# Patient Record
Sex: MALE | Race: BLACK OR AFRICAN AMERICAN
[De-identification: names, ages, dates, MRNs, and addresses within clinical notes are randomized per-mention and may not be internally consistent; named-entity substitution may affect disease eponyms.]

---

## 2017-01-30 ENCOUNTER — APPOINTMENT (OUTPATIENT)
Dept: SURGERY | Facility: CLINIC | Age: 57
End: 2017-01-30

## 2017-01-30 VITALS
BODY MASS INDEX: 27.11 KG/M2 | SYSTOLIC BLOOD PRESSURE: 121 MMHG | DIASTOLIC BLOOD PRESSURE: 78 MMHG | HEIGHT: 69 IN | HEART RATE: 60 BPM | TEMPERATURE: 98.3 F | WEIGHT: 183 LBS

## 2017-01-30 DIAGNOSIS — Z71.9 COUNSELING, UNSPECIFIED: ICD-10-CM

## 2017-02-13 ENCOUNTER — LABORATORY RESULT (OUTPATIENT)
Age: 57
End: 2017-02-13

## 2017-02-13 ENCOUNTER — APPOINTMENT (OUTPATIENT)
Dept: SURGERY | Facility: CLINIC | Age: 57
End: 2017-02-13

## 2018-06-19 ENCOUNTER — RECORD ABSTRACTING (OUTPATIENT)
Age: 58
End: 2018-06-19

## 2018-06-19 DIAGNOSIS — I05.9 RHEUMATIC MITRAL VALVE DISEASE, UNSPECIFIED: ICD-10-CM

## 2018-06-29 ENCOUNTER — APPOINTMENT (OUTPATIENT)
Dept: PULMONOLOGY | Facility: CLINIC | Age: 58
End: 2018-06-29

## 2018-08-10 ENCOUNTER — APPOINTMENT (OUTPATIENT)
Dept: PULMONOLOGY | Facility: CLINIC | Age: 58
End: 2018-08-10
Payer: COMMERCIAL

## 2018-08-10 VITALS
HEIGHT: 69 IN | SYSTOLIC BLOOD PRESSURE: 108 MMHG | OXYGEN SATURATION: 98 % | TEMPERATURE: 97.8 F | RESPIRATION RATE: 16 BRPM | HEART RATE: 67 BPM | WEIGHT: 170 LBS | DIASTOLIC BLOOD PRESSURE: 73 MMHG | BODY MASS INDEX: 25.18 KG/M2

## 2018-08-10 PROCEDURE — 94726 PLETHYSMOGRAPHY LUNG VOLUMES: CPT

## 2018-08-10 PROCEDURE — 99213 OFFICE O/P EST LOW 20 MIN: CPT | Mod: 25

## 2018-08-10 PROCEDURE — 94010 BREATHING CAPACITY TEST: CPT

## 2018-08-10 PROCEDURE — 94729 DIFFUSING CAPACITY: CPT

## 2018-11-28 ENCOUNTER — APPOINTMENT (OUTPATIENT)
Dept: PULMONOLOGY | Facility: CLINIC | Age: 58
End: 2018-11-28
Payer: COMMERCIAL

## 2018-11-28 VITALS
SYSTOLIC BLOOD PRESSURE: 127 MMHG | TEMPERATURE: 97.6 F | HEART RATE: 67 BPM | OXYGEN SATURATION: 97 % | DIASTOLIC BLOOD PRESSURE: 71 MMHG | RESPIRATION RATE: 18 BRPM

## 2018-11-28 PROCEDURE — 36415 COLL VENOUS BLD VENIPUNCTURE: CPT

## 2018-11-28 PROCEDURE — 94060 EVALUATION OF WHEEZING: CPT

## 2018-11-28 PROCEDURE — 99213 OFFICE O/P EST LOW 20 MIN: CPT | Mod: 25

## 2018-11-29 LAB
ACE BLD-CCNC: 171 U/L
ALBUMIN SERPL ELPH-MCNC: 4.4 G/DL
ALP BLD-CCNC: 81 U/L
ALT SERPL-CCNC: 26 U/L
ANION GAP SERPL CALC-SCNC: 12 MMOL/L
AST SERPL-CCNC: 29 U/L
BASOPHILS # BLD AUTO: 0.02 K/UL
BASOPHILS NFR BLD AUTO: 0.5 %
BILIRUB SERPL-MCNC: 0.3 MG/DL
BUN SERPL-MCNC: 8 MG/DL
CALCIUM SERPL-MCNC: 8.9 MG/DL
CHLORIDE SERPL-SCNC: 104 MMOL/L
CO2 SERPL-SCNC: 24 MMOL/L
CREAT SERPL-MCNC: 1 MG/DL
EOSINOPHIL # BLD AUTO: 0.21 K/UL
EOSINOPHIL NFR BLD AUTO: 5.7 %
GLUCOSE SERPL-MCNC: 105 MG/DL
HCT VFR BLD CALC: 41 %
HGB BLD-MCNC: 13.3 G/DL
IMM GRANULOCYTES NFR BLD AUTO: 0 %
LYMPHOCYTES # BLD AUTO: 1.01 K/UL
LYMPHOCYTES NFR BLD AUTO: 27.3 %
MAN DIFF?: NORMAL
MCHC RBC-ENTMCNC: 24.9 PG
MCHC RBC-ENTMCNC: 32.4 GM/DL
MCV RBC AUTO: 76.8 FL
MONOCYTES # BLD AUTO: 0.53 K/UL
MONOCYTES NFR BLD AUTO: 14.3 %
NEUTROPHILS # BLD AUTO: 1.93 K/UL
NEUTROPHILS NFR BLD AUTO: 52.2 %
PLATELET # BLD AUTO: 168 K/UL
POTASSIUM SERPL-SCNC: 4.6 MMOL/L
PROT SERPL-MCNC: 8.1 G/DL
RBC # BLD: 5.34 M/UL
RBC # FLD: 14.6 %
SODIUM SERPL-SCNC: 140 MMOL/L
WBC # FLD AUTO: 3.7 K/UL

## 2018-12-05 LAB — TOTAL IGE SMQN RAST: 21 KU/L

## 2018-12-12 ENCOUNTER — TRANSCRIPTION ENCOUNTER (OUTPATIENT)
Age: 58
End: 2018-12-12

## 2019-02-27 ENCOUNTER — APPOINTMENT (OUTPATIENT)
Dept: PULMONOLOGY | Facility: CLINIC | Age: 59
End: 2019-02-27
Payer: COMMERCIAL

## 2019-02-27 VITALS
HEIGHT: 69 IN | OXYGEN SATURATION: 97 % | DIASTOLIC BLOOD PRESSURE: 64 MMHG | SYSTOLIC BLOOD PRESSURE: 119 MMHG | HEART RATE: 70 BPM | WEIGHT: 170 LBS | BODY MASS INDEX: 25.18 KG/M2

## 2019-02-27 PROCEDURE — 94729 DIFFUSING CAPACITY: CPT

## 2019-02-27 PROCEDURE — 94727 GAS DIL/WSHOT DETER LNG VOL: CPT

## 2019-02-27 PROCEDURE — 94010 BREATHING CAPACITY TEST: CPT

## 2019-02-27 PROCEDURE — 99213 OFFICE O/P EST LOW 20 MIN: CPT | Mod: 25

## 2019-03-14 NOTE — ADDENDUM
[FreeTextEntry1] : Lab review\par CBC mild reduction in white blood count 3.7\par Hemoglobin 13.3 hematocrit 41.0\par Platelet count 168,000\par Monocytes 14.3%\par Glucose 105 electrolytes normal\par Serum calcium 8.9\par Liver function tests normal\par \par Serum IgE 21

## 2019-03-14 NOTE — REVIEW OF SYSTEMS
[As Noted in HPI] : as noted in HPI [Negative] : Pulmonary Hypertension [de-identified] : left upper vshoulder sarcoid skin lesion bx proven

## 2019-03-14 NOTE — PROCEDURE
[FreeTextEntry1] : PFT Aug 10 2018\par  no bronchodilator\par Very mild restrictive impairment with a total capacity 78% of predicted\par \par Flow rates are normal\par Moderate diffusion abnormality with a loss of functioning alveolar capillary units at 58% of predicted\par There was some noted to be some decline in the total capacity and diffusion but he does have a waxing and waning picture.\par HGB12.1\par \par PFT without bronchodilator February 27, 2019\par Flow rates normal range\par Minimal reduction in total lung capacity 77% predicted with a restrictive ventilatory impairment\par Moderate reduction diffusion 54% of predicted with loss of functioning alveolar capillary units\par Hemoglobin 13.3

## 2019-03-14 NOTE — HISTORY OF PRESENT ILLNESS
[Stable] : are stable [Difficulty Breathing During Exertion] : denies dyspnea on exertion [Feelings Of Weakness On Exertion] : denies exercise intolerance [Cough] : denies coughing [Wheezing] : denies wheezing [Regional Soft Tissue Swelling Both Lower Extremities] : denies lower extremity edema [Chest Pain Or Discomfort] : denies chest pain [Fever] : denies fever [Date: ___] : was performed [unfilled] [Wt Gain ___ Lbs] : no recent weight gain [Wt Loss ___ Lbs] : no recent weight loss [Oxygen] : the patient uses no supplemental oxygen [de-identified] : extensive finding consistent with sarcoidosis [FreeTextEntry1] : Patient with long-standing significant biopsy-proven sarcoidosis pulmonary and skin.\par No active symptoms of chest pain pleuritic chest pain fevers chills or night sweats purulent sputum. Denies hemoptysis. No increased shortness of breath. No cough or wheeze noted. No recent U. upper respiratory infections.\par \par Status post CAT scan the chest May 12, 2017 with no interval change compared to prior scan of April 6, 2016 able to demonstrate right upper lobe consolidation with no significant change left apical consolidation with no significant change. Innumerable pleural and parenchymal nodularity and nodules but again no change in size or number reported.\par Mediastinum demonstrates prominent mediastinal hilar axillary lymphadenopathy as well as subcarinal adenopathy or reported unchanged. There was some component of partial calcification of the mediastinal and hilar lymph node\par \par ADDENDUM\par 3/14/19\par Ophthalmology March 4, 2019\par No reported uveitis due to sarcoidosis\par

## 2019-03-14 NOTE — PHYSICAL EXAM
[General Appearance - Well Developed] : well developed [Normal Appearance] : normal appearance [Well Groomed] : well groomed [General Appearance - Well Nourished] : well nourished [No Deformities] : no deformities [General Appearance - In No Acute Distress] : no acute distress [Normal Conjunctiva] : the conjunctiva exhibited no abnormalities [Eyelids - No Xanthelasma] : the eyelids demonstrated no xanthelasmas [Normal Oropharynx] : normal oropharynx [Neck Appearance] : the appearance of the neck was normal [Neck Cervical Mass (___cm)] : no neck mass was observed [Jugular Venous Distention Increased] : there was no jugular-venous distention [Thyroid Diffuse Enlargement] : the thyroid was not enlarged [Thyroid Nodule] : there were no palpable thyroid nodules [Heart Rate And Rhythm] : heart rate and rhythm were normal [Heart Sounds] : normal S1 and S2 [Murmurs] : no murmurs present [Respiration, Rhythm And Depth] : normal respiratory rhythm and effort [Exaggerated Use Of Accessory Muscles For Inspiration] : no accessory muscle use [Auscultation Breath Sounds / Voice Sounds] : lungs were clear to auscultation bilaterally [Lungs Percussion] : the lungs were normal to percussion [Bowel Sounds] : normal bowel sounds [Abdomen Soft] : soft [Abdomen Tenderness] : non-tender [Abdomen Mass (___ Cm)] : no abdominal mass palpated [Nail Clubbing] : no clubbing of the fingernails [Cyanosis, Localized] : no localized cyanosis [Petechial Hemorrhages (___cm)] : no petechial hemorrhages [] : no ischemic changes [Deep Tendon Reflexes (DTR)] : deep tendon reflexes were 2+ and symmetric [Sensation] : the sensory exam was normal to light touch and pinprick [No Focal Deficits] : no focal deficits [Oriented To Time, Place, And Person] : oriented to person, place, and time [Impaired Insight] : insight and judgment were intact [Affect] : the affect was normal [FreeTextEntry1] : chronic sarcoid rash Left posterior  thorax

## 2019-03-14 NOTE — DISCUSSION/SUMMARY
[FreeTextEntry1] : Sarcoidosis\par \par Declined fu shot  \par f/u PFT with Box summer\par Noted last CXR April 2018-f/u\par Pending OPTH\par f/u ECHO

## 2019-03-18 ENCOUNTER — APPOINTMENT (OUTPATIENT)
Dept: CV DIAGNOSITCS | Facility: HOSPITAL | Age: 59
End: 2019-03-18

## 2019-03-18 ENCOUNTER — OUTPATIENT (OUTPATIENT)
Dept: OUTPATIENT SERVICES | Facility: HOSPITAL | Age: 59
LOS: 1 days | End: 2019-03-18
Payer: COMMERCIAL

## 2019-03-18 DIAGNOSIS — D86.0 SARCOIDOSIS OF LUNG: ICD-10-CM

## 2019-03-18 PROCEDURE — 93306 TTE W/DOPPLER COMPLETE: CPT

## 2019-03-18 PROCEDURE — 93306 TTE W/DOPPLER COMPLETE: CPT | Mod: 26

## 2019-05-29 ENCOUNTER — APPOINTMENT (OUTPATIENT)
Dept: PULMONOLOGY | Facility: CLINIC | Age: 59
End: 2019-05-29
Payer: COMMERCIAL

## 2019-05-29 VITALS
HEART RATE: 68 BPM | SYSTOLIC BLOOD PRESSURE: 114 MMHG | TEMPERATURE: 98.2 F | DIASTOLIC BLOOD PRESSURE: 71 MMHG | OXYGEN SATURATION: 96 %

## 2019-05-29 PROCEDURE — 71046 X-RAY EXAM CHEST 2 VIEWS: CPT

## 2019-05-29 PROCEDURE — 94060 EVALUATION OF WHEEZING: CPT

## 2019-05-29 PROCEDURE — 99213 OFFICE O/P EST LOW 20 MIN: CPT | Mod: 25

## 2019-05-29 NOTE — REVIEW OF SYSTEMS
[As Noted in HPI] : as noted in HPI [Negative] : Pulmonary Hypertension [de-identified] : left upper vshoulder sarcoid skin lesion bx proven

## 2019-05-29 NOTE — PHYSICAL EXAM
[General Appearance - Well Developed] : well developed [Normal Appearance] : normal appearance [Well Groomed] : well groomed [General Appearance - Well Nourished] : well nourished [No Deformities] : no deformities [General Appearance - In No Acute Distress] : no acute distress [Eyelids - No Xanthelasma] : the eyelids demonstrated no xanthelasmas [Normal Conjunctiva] : the conjunctiva exhibited no abnormalities [Normal Oropharynx] : normal oropharynx [Neck Appearance] : the appearance of the neck was normal [Neck Cervical Mass (___cm)] : no neck mass was observed [Jugular Venous Distention Increased] : there was no jugular-venous distention [Thyroid Diffuse Enlargement] : the thyroid was not enlarged [Thyroid Nodule] : there were no palpable thyroid nodules [Heart Rate And Rhythm] : heart rate and rhythm were normal [Heart Sounds] : normal S1 and S2 [Murmurs] : no murmurs present [Exaggerated Use Of Accessory Muscles For Inspiration] : no accessory muscle use [Respiration, Rhythm And Depth] : normal respiratory rhythm and effort [Auscultation Breath Sounds / Voice Sounds] : lungs were clear to auscultation bilaterally [Lungs Percussion] : the lungs were normal to percussion [Bowel Sounds] : normal bowel sounds [Abdomen Soft] : soft [Abdomen Tenderness] : non-tender [Abdomen Mass (___ Cm)] : no abdominal mass palpated [Nail Clubbing] : no clubbing of the fingernails [Cyanosis, Localized] : no localized cyanosis [Petechial Hemorrhages (___cm)] : no petechial hemorrhages [] : no ischemic changes [Deep Tendon Reflexes (DTR)] : deep tendon reflexes were 2+ and symmetric [Sensation] : the sensory exam was normal to light touch and pinprick [No Focal Deficits] : no focal deficits [Oriented To Time, Place, And Person] : oriented to person, place, and time [Affect] : the affect was normal [Impaired Insight] : insight and judgment were intact [FreeTextEntry1] : chronic sarcoid rash Left posterior  thorax

## 2019-05-29 NOTE — HISTORY OF PRESENT ILLNESS
[FreeTextEntry1] : Patient with long-standing significant biopsy-proven sarcoidosis pulmonary and skin.\par No active symptoms of chest pain pleuritic chest pain fevers chills or night sweats purulent sputum. Denies hemoptysis. No increased shortness of breath. No cough or wheeze noted. No recent U. upper respiratory infections.\par \par Status post CAT scan the chest May 12, 2017 with no interval change compared to prior scan of April 6, 2016 able to demonstrate right upper lobe consolidation with no significant change left apical consolidation with no significant change. Innumerable pleural and parenchymal nodularity and nodules but again no change in size or number reported.\par Mediastinum demonstrates prominent mediastinal hilar axillary lymphadenopathy as well as subcarinal adenopathy or reported unchanged. There was some component of partial calcification of the mediastinal and hilar lymph node\par \par ADDENDUM\par 3/14/19\par Ophthalmology March 4, 2019\par No reported uveitis due to sarcoidosis\par

## 2019-05-29 NOTE — DISCUSSION/SUMMARY
[FreeTextEntry1] : Echocardiogram March 18, 2019\par Minimal mitral regurgitation\par Normal left ventricular systolic function\par Normal right ventricular systolic function\par No reported pulmonary hypertension\par \par Pulmonary Sarcoidosis\par PFT with Box at f/u\par  Noted up  to date OPTH and ECHO

## 2019-05-29 NOTE — PROCEDURE
[FreeTextEntry1] : \par PFT without bronchodilator February 27, 2019\par Flow rates normal range\par Minimal reduction in total lung capacity 77% predicted with a restrictive ventilatory impairment\par Moderate reduction diffusion 54% of predicted with loss of functioning alveolar capillary units\par Hemoglobin 13.3\par \par Spirometry May 29, 2019\par Minimal reduction FVC 77% predicted otherwise normal flow rates with no decline\par No bronchodilator response at FEV1\par \par Chest x-ray PA lateral projection May 29, 2019\par Cardiac size normal\par Findings consistent with multiple bilateral nodularity interstitial lung disease consistent with known diagnosis of sarcoidosis\par Impression stage III–4 radiographic features of sarcoidosis

## 2019-08-19 ENCOUNTER — APPOINTMENT (OUTPATIENT)
Dept: SURGERY | Facility: CLINIC | Age: 59
End: 2019-08-19
Payer: COMMERCIAL

## 2019-08-19 PROCEDURE — 45378 DIAGNOSTIC COLONOSCOPY: CPT

## 2019-08-27 ENCOUNTER — APPOINTMENT (OUTPATIENT)
Dept: PULMONOLOGY | Facility: CLINIC | Age: 59
End: 2019-08-27
Payer: COMMERCIAL

## 2019-08-27 ENCOUNTER — TRANSCRIPTION ENCOUNTER (OUTPATIENT)
Age: 59
End: 2019-08-27

## 2019-08-27 VITALS
RESPIRATION RATE: 14 BRPM | DIASTOLIC BLOOD PRESSURE: 73 MMHG | BODY MASS INDEX: 25.18 KG/M2 | OXYGEN SATURATION: 99 % | HEART RATE: 84 BPM | WEIGHT: 170 LBS | HEIGHT: 69 IN | SYSTOLIC BLOOD PRESSURE: 107 MMHG

## 2019-08-27 LAB — POCT - HEMOGLOBIN (HGB), QUANTITATIVE, TRANSCUTANEOUS: 15.6

## 2019-08-27 PROCEDURE — 88738 HGB QUANT TRANSCUTANEOUS: CPT

## 2019-08-27 PROCEDURE — 94726 PLETHYSMOGRAPHY LUNG VOLUMES: CPT

## 2019-08-27 PROCEDURE — 94750: CPT

## 2019-08-27 PROCEDURE — 94060 EVALUATION OF WHEEZING: CPT

## 2019-08-27 PROCEDURE — 99213 OFFICE O/P EST LOW 20 MIN: CPT | Mod: 25

## 2019-08-27 PROCEDURE — 94664 DEMO&/EVAL PT USE INHALER: CPT | Mod: 59

## 2019-08-27 PROCEDURE — 36415 COLL VENOUS BLD VENIPUNCTURE: CPT

## 2019-08-27 PROCEDURE — 94729 DIFFUSING CAPACITY: CPT

## 2019-08-28 LAB
ALBUMIN SERPL ELPH-MCNC: 4.6 G/DL
ALP BLD-CCNC: 96 U/L
ALT SERPL-CCNC: 23 U/L
ANION GAP SERPL CALC-SCNC: 12 MMOL/L
AST SERPL-CCNC: 25 U/L
BASOPHILS # BLD AUTO: 0.03 K/UL
BASOPHILS NFR BLD AUTO: 0.6 %
BILIRUB SERPL-MCNC: 0.4 MG/DL
BUN SERPL-MCNC: 9 MG/DL
CALCIUM SERPL-MCNC: 9.3 MG/DL
CHLORIDE SERPL-SCNC: 104 MMOL/L
CO2 SERPL-SCNC: 25 MMOL/L
CREAT SERPL-MCNC: 0.92 MG/DL
EOSINOPHIL # BLD AUTO: 0.16 K/UL
EOSINOPHIL NFR BLD AUTO: 3.3 %
GLUCOSE SERPL-MCNC: 105 MG/DL
HCT VFR BLD CALC: 47.1 %
HGB BLD-MCNC: 14.7 G/DL
IMM GRANULOCYTES NFR BLD AUTO: 0.2 %
LYMPHOCYTES # BLD AUTO: 1.26 K/UL
LYMPHOCYTES NFR BLD AUTO: 25.9 %
MAN DIFF?: NORMAL
MCHC RBC-ENTMCNC: 24.7 PG
MCHC RBC-ENTMCNC: 31.2 GM/DL
MCV RBC AUTO: 79 FL
MONOCYTES # BLD AUTO: 0.6 K/UL
MONOCYTES NFR BLD AUTO: 12.3 %
NEUTROPHILS # BLD AUTO: 2.8 K/UL
NEUTROPHILS NFR BLD AUTO: 57.7 %
PLATELET # BLD AUTO: 183 K/UL
POTASSIUM SERPL-SCNC: 5.4 MMOL/L
PROT SERPL-MCNC: 8.2 G/DL
RBC # BLD: 5.96 M/UL
RBC # FLD: 14.3 %
SODIUM SERPL-SCNC: 141 MMOL/L
WBC # FLD AUTO: 4.86 K/UL

## 2019-08-30 LAB — ACE BLD-CCNC: 156 U/L

## 2019-08-30 NOTE — REVIEW OF SYSTEMS
[As Noted in HPI] : as noted in HPI [Negative] : Pulmonary Hypertension [de-identified] : left upper vshoulder sarcoid skin lesion bx proven

## 2019-08-30 NOTE — PROCEDURE
[FreeTextEntry1] : PFT  body box August 27, 2019\par Spirometry normal\par No response to bronchodilator at FEV1\par Lung volumes normal with total lung capacity 81% predicted.\par Resistance and specific conductance normal.\par Moderate reduction diffusion 54% predicted with a loss of functioning alveolocapillary units\par No decline in pulmonary physiology\par \par Chest x-ray PA lateral projection May 29, 2019\par Cardiac size normal\par Findings consistent with multiple bilateral nodularity interstitial lung disease consistent with known diagnosis of sarcoidosis\par Impression stage III–4 radiographic features of sarcoidosis\par \par Blood Draw\par CBC comprehensive metabolic profile ACE level\par Data review August 28, 2019\par CBC White blood count 4.86 hemoglobin 14.7 hematocrit 47.1\par Platelet count 183,000\par Serum potassium 5.4\par BUN 9 creatinine 0.92\par Liver function testing normal\par Total protein normal 8.2 ACE level pending\par  ACE elevated 156 but decline from prior 171

## 2019-08-30 NOTE — HISTORY OF PRESENT ILLNESS
[Stable] : are stable [Difficulty Breathing During Exertion] : denies dyspnea on exertion [Feelings Of Weakness On Exertion] : denies exercise intolerance [Cough] : denies coughing [Wheezing] : denies wheezing [Regional Soft Tissue Swelling Both Lower Extremities] : denies lower extremity edema [Chest Pain Or Discomfort] : denies chest pain [Fever] : denies fever [Date: ___] : was performed [unfilled] [Wt Gain ___ Lbs] : no recent weight gain [Wt Loss ___ Lbs] : no recent weight loss [Oxygen] : the patient uses no supplemental oxygen [de-identified] : extensive finding consistent with sarcoidosis [FreeTextEntry1] : Patient with long-standing significant biopsy-proven sarcoidosis pulmonary and skin.\par No active symptoms of chest pain pleuritic chest pain fevers chills or night sweats purulent sputum. Denies hemoptysis. No increased shortness of breath. No cough or wheeze noted. No recent U. upper respiratory infections.\par \par Status post CAT scan the chest May 12, 2017 with no interval change compared to prior scan of April 6, 2016 able to demonstrate right upper lobe consolidation with no significant change left apical consolidation with no significant change. Innumerable pleural and parenchymal nodularity and nodules but again no change in size or number reported.\par Mediastinum demonstrates prominent mediastinal hilar axillary lymphadenopathy as well as subcarinal adenopathy or reported unchanged. There was some component of partial calcification of the mediastinal and hilar lymph node\par \par ADDENDUM\par 3/14/19\par Ophthalmology March 4, 2019\par No reported uveitis due to sarcoidosis\par

## 2019-08-30 NOTE — DISCUSSION/SUMMARY
[FreeTextEntry1] : Echocardiogram March 18, 2019\par Minimal mitral regurgitation\par Normal left ventricular systolic function\par Normal right ventricular systolic function\par No reported pulmonary hypertension\par Pulmonary sarcoidosis with extensive radiographic changes and abnormal pulmonary physiology with diffusion 54% predicted but stable without decline\par Ophthalmology appointment March 2020\par Echocardiogram follow-up March 2020\par Laboratory data pending

## 2019-08-30 NOTE — PHYSICAL EXAM
[Normal Appearance] : normal appearance [General Appearance - Well Developed] : well developed [Well Groomed] : well groomed [General Appearance - Well Nourished] : well nourished [No Deformities] : no deformities [General Appearance - In No Acute Distress] : no acute distress [Eyelids - No Xanthelasma] : the eyelids demonstrated no xanthelasmas [Normal Conjunctiva] : the conjunctiva exhibited no abnormalities [Normal Oropharynx] : normal oropharynx [Neck Appearance] : the appearance of the neck was normal [Jugular Venous Distention Increased] : there was no jugular-venous distention [Neck Cervical Mass (___cm)] : no neck mass was observed [Thyroid Nodule] : there were no palpable thyroid nodules [Thyroid Diffuse Enlargement] : the thyroid was not enlarged [Heart Sounds] : normal S1 and S2 [Heart Rate And Rhythm] : heart rate and rhythm were normal [Murmurs] : no murmurs present [Exaggerated Use Of Accessory Muscles For Inspiration] : no accessory muscle use [Respiration, Rhythm And Depth] : normal respiratory rhythm and effort [Lungs Percussion] : the lungs were normal to percussion [Auscultation Breath Sounds / Voice Sounds] : lungs were clear to auscultation bilaterally [Bowel Sounds] : normal bowel sounds [Abdomen Soft] : soft [Abdomen Tenderness] : non-tender [Abdomen Mass (___ Cm)] : no abdominal mass palpated [Nail Clubbing] : no clubbing of the fingernails [Cyanosis, Localized] : no localized cyanosis [Petechial Hemorrhages (___cm)] : no petechial hemorrhages [] : no ischemic changes [Deep Tendon Reflexes (DTR)] : deep tendon reflexes were 2+ and symmetric [No Focal Deficits] : no focal deficits [Sensation] : the sensory exam was normal to light touch and pinprick [Oriented To Time, Place, And Person] : oriented to person, place, and time [Affect] : the affect was normal [Impaired Insight] : insight and judgment were intact [FreeTextEntry1] : chronic sarcoid rash Left posterior  thorax

## 2019-11-19 ENCOUNTER — APPOINTMENT (OUTPATIENT)
Dept: PULMONOLOGY | Facility: CLINIC | Age: 59
End: 2019-11-19
Payer: COMMERCIAL

## 2019-11-19 VITALS
TEMPERATURE: 98.1 F | HEIGHT: 69 IN | OXYGEN SATURATION: 98 % | RESPIRATION RATE: 16 BRPM | SYSTOLIC BLOOD PRESSURE: 115 MMHG | DIASTOLIC BLOOD PRESSURE: 77 MMHG | HEART RATE: 65 BPM | WEIGHT: 170 LBS | BODY MASS INDEX: 25.18 KG/M2

## 2019-11-19 PROCEDURE — 94060 EVALUATION OF WHEEZING: CPT

## 2019-11-19 PROCEDURE — 99213 OFFICE O/P EST LOW 20 MIN: CPT | Mod: 25

## 2019-11-19 NOTE — PROCEDURE
[FreeTextEntry1] : Spirometry  11/19/2019\par  normal  flow  rates mild  decline  compared to  earlier  data\par No  BD  at  FEV!\par \par PFT  body box August 27, 2019\par Spirometry normal\par No response to bronchodilator at FEV1\par Lung volumes normal with total lung capacity 81% predicted.\par Resistance and specific conductance normal.\par Moderate reduction diffusion 54% predicted with a loss of functioning alveolocapillary units\par No decline in pulmonary physiology\par \par Chest x-ray PA lateral projection May 29, 2019\par Cardiac size normal\par Findings consistent with multiple bilateral nodularity interstitial lung disease consistent with known diagnosis of sarcoidosis\par Impression stage III–4 radiographic features of sarcoidosis\par \par Blood Draw\par CBC comprehensive metabolic profile ACE level\par Data review August 28, 2019\par CBC White blood count 4.86 hemoglobin 14.7 hematocrit 47.1\par Platelet count 183,000\par Serum potassium 5.4\par BUN 9 creatinine 0.92\par Liver function testing normal\par Total protein normal 8.2 ACE level pending\par  ACE elevated 156 but decline from prior 171

## 2019-11-19 NOTE — PHYSICAL EXAM
[Normal Appearance] : normal appearance [General Appearance - Well Developed] : well developed [Well Groomed] : well groomed [General Appearance - Well Nourished] : well nourished [No Deformities] : no deformities [Normal Conjunctiva] : the conjunctiva exhibited no abnormalities [General Appearance - In No Acute Distress] : no acute distress [Normal Oropharynx] : normal oropharynx [Neck Appearance] : the appearance of the neck was normal [Eyelids - No Xanthelasma] : the eyelids demonstrated no xanthelasmas [Neck Cervical Mass (___cm)] : no neck mass was observed [Jugular Venous Distention Increased] : there was no jugular-venous distention [Thyroid Diffuse Enlargement] : the thyroid was not enlarged [Thyroid Nodule] : there were no palpable thyroid nodules [Heart Rate And Rhythm] : heart rate and rhythm were normal [Heart Sounds] : normal S1 and S2 [Murmurs] : no murmurs present [Respiration, Rhythm And Depth] : normal respiratory rhythm and effort [Exaggerated Use Of Accessory Muscles For Inspiration] : no accessory muscle use [Auscultation Breath Sounds / Voice Sounds] : lungs were clear to auscultation bilaterally [Lungs Percussion] : the lungs were normal to percussion [Abdomen Soft] : soft [Bowel Sounds] : normal bowel sounds [Abdomen Tenderness] : non-tender [Abdomen Mass (___ Cm)] : no abdominal mass palpated [Petechial Hemorrhages (___cm)] : no petechial hemorrhages [Cyanosis, Localized] : no localized cyanosis [Nail Clubbing] : no clubbing of the fingernails [] : no ischemic changes [Sensation] : the sensory exam was normal to light touch and pinprick [No Focal Deficits] : no focal deficits [Deep Tendon Reflexes (DTR)] : deep tendon reflexes were 2+ and symmetric [Oriented To Time, Place, And Person] : oriented to person, place, and time [Impaired Insight] : insight and judgment were intact [Affect] : the affect was normal [FreeTextEntry1] : chronic sarcoid rash Left posterior  thorax

## 2019-11-19 NOTE — REVIEW OF SYSTEMS
[As Noted in HPI] : as noted in HPI [Negative] : Pulmonary Hypertension [de-identified] : left upper vshoulder sarcoid skin lesion bx proven

## 2019-11-19 NOTE — DISCUSSION/SUMMARY
[FreeTextEntry1] : Echocardiogram March 18, 2019\par Minimal mitral regurgitation\par Normal left ventricular systolic function\par Normal right ventricular systolic function\par No reported pulmonary hypertension\par \par Sarcoidosis stage  4  radiographically\par No  active  respiratory  sxs \par F/u  3  months\par  3 mos f/u with transflow\par March 2020 ECHO\par May 2020 CXR\par declined  flu  vaccine

## 2019-11-19 NOTE — HISTORY OF PRESENT ILLNESS
[Stable] : are stable [Difficulty Breathing During Exertion] : denies dyspnea on exertion [Feelings Of Weakness On Exertion] : denies exercise intolerance [Cough] : denies coughing [Wheezing] : denies wheezing [Regional Soft Tissue Swelling Both Lower Extremities] : denies lower extremity edema [Chest Pain Or Discomfort] : denies chest pain [Fever] : denies fever [Date: ___] : was performed [unfilled] [Wt Gain ___ Lbs] : no recent weight gain [Oxygen] : the patient uses no supplemental oxygen [Wt Loss ___ Lbs] : no recent weight loss [de-identified] : extensive finding consistent with sarcoidosis [FreeTextEntry1] : Patient with long-standing significant biopsy-proven sarcoidosis pulmonary and skin.\par No active symptoms of chest pain pleuritic chest pain fevers chills or night sweats purulent sputum. Denies hemoptysis. No increased shortness of breath. No cough or wheeze noted. No recent U. upper respiratory infections.\par \par Status post CAT scan the chest May 12, 2017 with no interval change compared to prior scan of April 6, 2016 able to demonstrate right upper lobe consolidation with no significant change left apical consolidation with no significant change. Innumerable pleural and parenchymal nodularity and nodules but again no change in size or number reported.\par Mediastinum demonstrates prominent mediastinal hilar axillary lymphadenopathy as well as subcarinal adenopathy or reported unchanged. There was some component of partial calcification of the mediastinal and hilar lymph node\par \par ADDENDUM\par 3/14/19\par Ophthalmology March 4, 2019\par No reported uveitis due to sarcoidosis\par

## 2020-02-18 ENCOUNTER — APPOINTMENT (OUTPATIENT)
Dept: CARDIOLOGY | Facility: CLINIC | Age: 60
End: 2020-02-18
Payer: COMMERCIAL

## 2020-02-18 ENCOUNTER — APPOINTMENT (OUTPATIENT)
Dept: PULMONOLOGY | Facility: CLINIC | Age: 60
End: 2020-02-18
Payer: COMMERCIAL

## 2020-02-18 VITALS
DIASTOLIC BLOOD PRESSURE: 75 MMHG | RESPIRATION RATE: 17 BRPM | HEART RATE: 72 BPM | SYSTOLIC BLOOD PRESSURE: 115 MMHG | HEIGHT: 69 IN | TEMPERATURE: 97.9 F | OXYGEN SATURATION: 97 %

## 2020-02-18 PROCEDURE — 94060 EVALUATION OF WHEEZING: CPT

## 2020-02-18 PROCEDURE — 99214 OFFICE O/P EST MOD 30 MIN: CPT | Mod: 25

## 2020-02-18 PROCEDURE — 93306 TTE W/DOPPLER COMPLETE: CPT

## 2020-02-18 NOTE — HISTORY OF PRESENT ILLNESS
[Wt Gain ___ Lbs] : no recent weight gain [Wt Loss ___ Lbs] : no recent weight loss [Oxygen] : the patient uses no supplemental oxygen [de-identified] : extensive finding consistent with sarcoidosis [FreeTextEntry1] : Patient with long-standing significant biopsy-proven sarcoidosis pulmonary and skin.\par No active symptoms of chest pain pleuritic chest pain fevers chills or night sweats purulent sputum. Denies hemoptysis. No increased shortness of breath. No cough or wheeze noted. No recent U. upper respiratory infections.\par \par Status post CAT scan the chest May 12, 2017 with no interval change compared to prior scan of April 6, 2016 able to demonstrate right upper lobe consolidation with no significant change left apical consolidation with no significant change. Innumerable pleural and parenchymal nodularity and nodules but again no change in size or number reported.\par Mediastinum demonstrates prominent mediastinal hilar axillary lymphadenopathy as well as subcarinal adenopathy or reported unchanged. There was some component of partial calcification of the mediastinal and hilar lymph node\par \par ADDENDUM\par 3/14/19\par Ophthalmology March 4, 2019\par No reported uveitis due to sarcoidosis\par

## 2020-02-18 NOTE — PROCEDURE
[FreeTextEntry1] : PFT February 18, 2020\par Very minimal reduction in flow rates\par No bronchodilator response\par Total capacity 77% of predicted cannot exclude a very mild restrictive component\par Severe reduction diffusion 49% predicted with a loss of functioning alveolocapillary units\par \par PFT  body box August 27, 2019\par Spirometry normal\par No response to bronchodilator at FEV1\par Lung volumes normal with total lung capacity 81% predicted.\par Resistance and specific conductance normal.\par Moderate reduction diffusion 54% predicted with a loss of functioning alveolocapillary units\par No decline in pulmonary physiology\par \par Chest x-ray PA lateral projection May 29, 2019\par Cardiac size normal\par Findings consistent with multiple bilateral nodularity interstitial lung disease consistent with known diagnosis of sarcoidosis\par Impression stage III–4 radiographic features of sarcoidosis\par \par Blood Draw\par CBC comprehensive metabolic profile ACE level\par Data review August 28, 2019\par CBC White blood count 4.86 hemoglobin 14.7 hematocrit 47.1\par Platelet count 183,000\par Serum potassium 5.4\par BUN 9 creatinine 0.92\par Liver function testing normal\par Total protein normal 8.2 ACE level pending\par  ACE elevated 156 but decline from prior 171\par \par Spoke with Dr. Frank cardiology verbal echocardiogram completed February 18, 2020\par Unremarkable study with no reported pulmonary hypertension

## 2020-02-18 NOTE — DISCUSSION/SUMMARY
[FreeTextEntry1] : Echocardiogram March 18, 2019-follow-up March 2020 ordered\par Minimal mitral regurgitation\par Normal left ventricular systolic function\par Normal right ventricular systolic function\par No reported pulmonary hypertension\par \par Sarcoidosis stage  4  radiographically\par No  active  respiratory  sxs \par F/u  3  months with year f/u Chest XRAY\par  3 mos f/u with transflow\par March 2020 ECHO pleated at Dr. Frank and verbally reported negative for pulmonary hypertension or valvular heart disease\par March 2020 OPTH f/u script provided \par May 2020 CXR\par declined  flu  vaccine

## 2020-05-19 ENCOUNTER — APPOINTMENT (OUTPATIENT)
Dept: PULMONOLOGY | Facility: CLINIC | Age: 60
End: 2020-05-19
Payer: COMMERCIAL

## 2020-05-19 PROCEDURE — 99213 OFFICE O/P EST LOW 20 MIN: CPT | Mod: 95

## 2020-05-19 NOTE — PHYSICAL EXAM
[Normal Appearance] : normal appearance [No Acute Distress] : no acute distress [No Cyanosis] : no cyanosis [No Resp Distress] : no resp distress [No Edema] : no edema [Normal Color/ Pigmentation] : normal color/ pigmentation [No Rash] : no rash [Oriented x3] : oriented x3 [Normal Affect] : normal affect

## 2020-05-19 NOTE — REVIEW OF SYSTEMS
[As Noted in HPI] : as noted in HPI [Negative] : Pulmonary Hypertension [de-identified] : left upper vshoulder sarcoid skin lesion bx proven

## 2020-05-19 NOTE — HISTORY OF PRESENT ILLNESS
[Home] : at home, [unfilled] , at the time of the visit. [Medical Office: ([facility])___] : at the medical office located in  [Patient] : the patient [TextBox_4] : This visit was provided via telehealth using real-time 2-way audio visual technology. The patient, AUDREY NOGUEIRA , was located at home, [id number] [facility]\[place] [address]  at the time of the visit.\par The provider, Dr. Arash Hardy, was located at office [address] [place] [address] at the time of the visit. \par \par  The patient, Mr. AUDREY NOGUEIRA  and Physician Arash Hardy DO [facility] participated in the telehealth encounter.\par \par Verbal consent obtained by  from patient\par \par Overall doing well\par No respiratory complications\par No cough purulent sputum\par He states he was concerned that his wife may have had COVID approximately 4 weeks ago although she was not tested and nobody in the family who lives in the same household did get sick\par \par Patient himself denies shortness of breath cough wheeze sputum chest pain chest tightness exertional dyspnea\par No GI symptoms\par Taste and smell intact\par No severe headaches\par No abnormal skin rashes\par

## 2020-05-19 NOTE — DISCUSSION/SUMMARY
[FreeTextEntry1] : Echocardiogram March 18, 2019-follow-up March 2020 ordered\par Minimal mitral regurgitation\par Normal left ventricular systolic function\par Normal right ventricular systolic function\par No reported pulmonary hypertension\par \par Sarcoidosis stage  4  radiographically\par No  active  respiratory  sxs \par F/u  3  months with year f/u Chest XRAY\par  3 mos f/u with transflow\par watch ACE level\par March 2020 ECHO pleated at Dr. Frank and verbally reported negative for pulmonary hypertension or valvular heart disease\par March 2020 OPTH f/u script provided \par May 2020 CXR\par declined  flu  vaccine-we will repeat her offer this early fall 2020 season\par COVID-19 precautions\par When patient comes to the office for his x-ray and possible breathing test will offer COVID antibody testing for completeness\par

## 2020-07-14 ENCOUNTER — APPOINTMENT (OUTPATIENT)
Dept: PULMONOLOGY | Facility: CLINIC | Age: 60
End: 2020-07-14
Payer: COMMERCIAL

## 2020-07-14 VITALS
SYSTOLIC BLOOD PRESSURE: 119 MMHG | DIASTOLIC BLOOD PRESSURE: 86 MMHG | RESPIRATION RATE: 16 BRPM | TEMPERATURE: 98.3 F | OXYGEN SATURATION: 95 % | HEART RATE: 105 BPM

## 2020-07-14 DIAGNOSIS — Z11.59 ENCOUNTER FOR SCREENING FOR OTHER VIRAL DISEASES: ICD-10-CM

## 2020-07-14 PROCEDURE — 99214 OFFICE O/P EST MOD 30 MIN: CPT | Mod: 25

## 2020-07-14 PROCEDURE — 36415 COLL VENOUS BLD VENIPUNCTURE: CPT

## 2020-07-14 PROCEDURE — 71046 X-RAY EXAM CHEST 2 VIEWS: CPT

## 2020-07-14 NOTE — PROCEDURE
[FreeTextEntry1] : Chest x-ray PA lateral July 14, 2020\par Normal cardiac size extensive bilateral opacities with more consolidation right upper lateral lung zone\par Compared to a chest x-ray of May 29, 2019 there is no gross interval change identified\par Impression consistent with known diagnosis of stage IV pulmonary sarcoidosis\par \par PFT February 18, 2020\par Very minimal reduction in flow rates\par No bronchodilator response\par Total capacity 77% of predicted cannot exclude a very mild restrictive component\par Severe reduction diffusion 49% predicted with a loss of functioning alveolocapillary units\par \par PFT  body box August 27, 2019\par Spirometry normal\par No response to bronchodilator at FEV1\par Lung volumes normal with total lung capacity 81% predicted.\par Resistance and specific conductance normal.\par Moderate reduction diffusion 54% predicted with a loss of functioning alveolocapillary units\par No decline in pulmonary physiology\par \par Chest x-ray PA lateral projection May 29, 2019\par Cardiac size normal\par Findings consistent with multiple bilateral nodularity interstitial lung disease consistent with known diagnosis of sarcoidosis\par Impression stage III–4 radiographic features of sarcoidosis\par \par Blood Draw\par CBC comprehensive metabolic profile ACE level\par Data review August 28, 2019\par CBC White blood count 4.86 hemoglobin 14.7 hematocrit 47.1\par Platelet count 183,000\par Serum potassium 5.4\par BUN 9 creatinine 0.92\par Liver function testing normal\par Total protein normal 8.2 ACE level pending\par  ACE elevated 156 but decline from prior 171\par \par Spoke with Dr. Frank cardiology verbal echocardiogram completed February 18, 2020\par Unremarkable study with no reported pulmonary hypertension

## 2020-07-14 NOTE — DISCUSSION/SUMMARY
[FreeTextEntry1] : \par Sarcoidosis stage  4  radiographically\par No  active  respiratory  sxs \par F/u  3  months with year f/u Chest XRAY\par  3 mos f/u with transflow\par March 2020 ECHO pleated at Dr. Frank and verbally reported negative for pulmonary hypertension or valvular heart disease\par March 2020 OPTH f/u script provided \par May 2020 CXR\par declined  flu  vaccine

## 2020-07-14 NOTE — REVIEW OF SYSTEMS
[As Noted in HPI] : as noted in HPI [Negative] : Endocrine [de-identified] : left upper vshoulder sarcoid skin lesion bx proven

## 2020-07-14 NOTE — PHYSICAL EXAM
[Normal Appearance] : normal appearance [General Appearance - Well Developed] : well developed [Well Groomed] : well groomed [No Deformities] : no deformities [General Appearance - Well Nourished] : well nourished [General Appearance - In No Acute Distress] : no acute distress [Normal Oropharynx] : normal oropharynx [Eyelids - No Xanthelasma] : the eyelids demonstrated no xanthelasmas [Normal Conjunctiva] : the conjunctiva exhibited no abnormalities [Neck Appearance] : the appearance of the neck was normal [Neck Cervical Mass (___cm)] : no neck mass was observed [Thyroid Nodule] : there were no palpable thyroid nodules [Jugular Venous Distention Increased] : there was no jugular-venous distention [Thyroid Diffuse Enlargement] : the thyroid was not enlarged [Heart Sounds] : normal S1 and S2 [Heart Rate And Rhythm] : heart rate and rhythm were normal [Respiration, Rhythm And Depth] : normal respiratory rhythm and effort [Murmurs] : no murmurs present [Auscultation Breath Sounds / Voice Sounds] : lungs were clear to auscultation bilaterally [Exaggerated Use Of Accessory Muscles For Inspiration] : no accessory muscle use [Lungs Percussion] : the lungs were normal to percussion [Bowel Sounds] : normal bowel sounds [Abdomen Soft] : soft [Abdomen Tenderness] : non-tender [Abdomen Mass (___ Cm)] : no abdominal mass palpated [Cyanosis, Localized] : no localized cyanosis [Petechial Hemorrhages (___cm)] : no petechial hemorrhages [Nail Clubbing] : no clubbing of the fingernails [] : no ischemic changes [Deep Tendon Reflexes (DTR)] : deep tendon reflexes were 2+ and symmetric [No Focal Deficits] : no focal deficits [Sensation] : the sensory exam was normal to light touch and pinprick [Impaired Insight] : insight and judgment were intact [Oriented To Time, Place, And Person] : oriented to person, place, and time [Affect] : the affect was normal [FreeTextEntry1] : chronic sarcoid rash Left posterior  thorax

## 2020-07-14 NOTE — HISTORY OF PRESENT ILLNESS
[Stable] : are stable [Difficulty Breathing During Exertion] : denies dyspnea on exertion [Feelings Of Weakness On Exertion] : denies exercise intolerance [Cough] : denies coughing [Wheezing] : denies wheezing [Chest Pain Or Discomfort] : denies chest pain [Regional Soft Tissue Swelling Both Lower Extremities] : denies lower extremity edema [Fever] : denies fever [Date: ___] : was performed [unfilled] [Wt Gain ___ Lbs] : no recent weight gain [Wt Loss ___ Lbs] : no recent weight loss [Oxygen] : the patient uses no supplemental oxygen [de-identified] : extensive finding consistent with sarcoidosis [FreeTextEntry1] : Patient with long-standing significant biopsy-proven sarcoidosis pulmonary and skin.\par No active symptoms of chest pain pleuritic chest pain fevers chills or night sweats purulent sputum. Denies hemoptysis. No increased shortness of breath. No cough or wheeze noted. No recent U. upper respiratory infections.\par \par Status post CAT scan the chest May 12, 2017 with no interval change compared to prior scan of April 6, 2016 able to demonstrate right upper lobe consolidation with no significant change left apical consolidation with no significant change. Innumerable pleural and parenchymal nodularity and nodules but again no change in size or number reported.\par Mediastinum demonstrates prominent mediastinal hilar axillary lymphadenopathy as well as subcarinal adenopathy or reported unchanged. There was some component of partial calcification of the mediastinal and hilar lymph node\par \par ADDENDUM\par 3/14/19\par Ophthalmology March 4, 2019\par No reported uveitis due to sarcoidosis\par

## 2020-07-15 LAB
SARS-COV-2 IGG SERPL IA-ACNC: 0.32 INDEX
SARS-COV-2 IGG SERPL QL IA: NEGATIVE

## 2020-07-16 LAB — ACE BLD-CCNC: 168 U/L

## 2020-08-12 DIAGNOSIS — Z01.818 ENCOUNTER FOR OTHER PREPROCEDURAL EXAMINATION: ICD-10-CM

## 2020-08-13 ENCOUNTER — APPOINTMENT (OUTPATIENT)
Dept: DISASTER EMERGENCY | Facility: CLINIC | Age: 60
End: 2020-08-13

## 2020-08-13 LAB — SARS-COV-2 N GENE NPH QL NAA+PROBE: NOT DETECTED

## 2020-08-18 ENCOUNTER — APPOINTMENT (OUTPATIENT)
Dept: PULMONOLOGY | Facility: CLINIC | Age: 60
End: 2020-08-18
Payer: COMMERCIAL

## 2020-08-18 ENCOUNTER — APPOINTMENT (OUTPATIENT)
Dept: PULMONOLOGY | Facility: CLINIC | Age: 60
End: 2020-08-18

## 2020-08-18 VITALS
SYSTOLIC BLOOD PRESSURE: 118 MMHG | BODY MASS INDEX: 25.18 KG/M2 | HEIGHT: 69 IN | RESPIRATION RATE: 16 BRPM | TEMPERATURE: 98 F | OXYGEN SATURATION: 97 % | DIASTOLIC BLOOD PRESSURE: 81 MMHG | HEART RATE: 75 BPM | WEIGHT: 170 LBS

## 2020-08-18 LAB — POCT - HEMOGLOBIN (HGB), QUANTITATIVE, TRANSCUTANEOUS: 14.5

## 2020-08-18 PROCEDURE — 94729 DIFFUSING CAPACITY: CPT

## 2020-08-18 PROCEDURE — 94750: CPT

## 2020-08-18 PROCEDURE — 94010 BREATHING CAPACITY TEST: CPT

## 2020-08-18 PROCEDURE — 95012 NITRIC OXIDE EXP GAS DETER: CPT

## 2020-08-18 PROCEDURE — 88738 HGB QUANT TRANSCUTANEOUS: CPT

## 2020-08-18 PROCEDURE — 94726 PLETHYSMOGRAPHY LUNG VOLUMES: CPT

## 2020-11-16 ENCOUNTER — APPOINTMENT (OUTPATIENT)
Dept: PULMONOLOGY | Facility: CLINIC | Age: 60
End: 2020-11-16
Payer: COMMERCIAL

## 2020-11-16 VITALS
SYSTOLIC BLOOD PRESSURE: 120 MMHG | RESPIRATION RATE: 16 BRPM | DIASTOLIC BLOOD PRESSURE: 82 MMHG | OXYGEN SATURATION: 99 % | HEART RATE: 68 BPM | TEMPERATURE: 98.1 F

## 2020-11-16 DIAGNOSIS — Z20.828 CONTACT WITH AND (SUSPECTED) EXPOSURE TO OTHER VIRAL COMMUNICABLE DISEASES: ICD-10-CM

## 2020-11-16 PROCEDURE — 99213 OFFICE O/P EST LOW 20 MIN: CPT

## 2020-11-16 NOTE — HISTORY OF PRESENT ILLNESS
[Stable] : are stable [Difficulty Breathing During Exertion] : denies dyspnea on exertion [Feelings Of Weakness On Exertion] : denies exercise intolerance [Cough] : denies coughing [Wheezing] : denies wheezing [Regional Soft Tissue Swelling Both Lower Extremities] : denies lower extremity edema [Chest Pain Or Discomfort] : denies chest pain [Fever] : denies fever [Date: ___] : was performed [unfilled] [Wt Gain ___ Lbs] : no recent weight gain [Wt Loss ___ Lbs] : no recent weight loss [Oxygen] : the patient uses no supplemental oxygen [de-identified] : extensive finding consistent with sarcoidosis [FreeTextEntry1] : Patient with long-standing significant biopsy-proven sarcoidosis pulmonary and skin.\par No active symptoms of chest pain pleuritic chest pain fevers chills or night sweats purulent sputum. Denies hemoptysis. No increased shortness of breath. No cough or wheeze noted. No recent U. upper respiratory infections.\par \par Status post CAT scan the chest May 12, 2017 with no interval change compared to prior scan of April 6, 2016 able to demonstrate right upper lobe consolidation with no significant change left apical consolidation with no significant change. Innumerable pleural and parenchymal nodularity and nodules but again no change in size or number reported.\par Mediastinum demonstrates prominent mediastinal hilar axillary lymphadenopathy as well as subcarinal adenopathy or reported unchanged. There was some component of partial calcification of the mediastinal and hilar lymph node\par \par ADDENDUM\par 3/14/19\par Ophthalmology March 4, 2019\par No reported uveitis due to sarcoidosis\par

## 2020-11-16 NOTE — DISCUSSION/SUMMARY
[FreeTextEntry1] : \par Sarcoidosis stage  4  radiographically\par No  active  respiratory  sxs \par Chest XRAY yearly\par  3 mos f/u with transflow\par March 2020 ECHO pleated at Dr. Frank and verbally reported negative for pulmonary hypertension or valvular heart disease\par March 2020 OPTH f/u script provided f/u\par declined  flu  vaccine

## 2020-11-16 NOTE — REVIEW OF SYSTEMS
[As Noted in HPI] : as noted in HPI [Negative] : Pulmonary Hypertension [de-identified] : left upper vshoulder sarcoid skin lesion bx proven

## 2020-11-19 ENCOUNTER — APPOINTMENT (OUTPATIENT)
Dept: DISASTER EMERGENCY | Facility: CLINIC | Age: 60
End: 2020-11-19

## 2020-11-20 LAB — SARS-COV-2 N GENE NPH QL NAA+PROBE: NOT DETECTED

## 2020-11-24 ENCOUNTER — APPOINTMENT (OUTPATIENT)
Dept: PULMONOLOGY | Facility: CLINIC | Age: 60
End: 2020-11-24
Payer: COMMERCIAL

## 2020-11-24 ENCOUNTER — APPOINTMENT (OUTPATIENT)
Dept: PULMONOLOGY | Facility: CLINIC | Age: 60
End: 2020-11-24

## 2020-11-24 VITALS
HEART RATE: 79 BPM | WEIGHT: 170 LBS | RESPIRATION RATE: 15 BRPM | BODY MASS INDEX: 25.18 KG/M2 | SYSTOLIC BLOOD PRESSURE: 118 MMHG | OXYGEN SATURATION: 98 % | DIASTOLIC BLOOD PRESSURE: 75 MMHG | HEIGHT: 69 IN | TEMPERATURE: 98.2 F

## 2020-11-24 PROCEDURE — 94010 BREATHING CAPACITY TEST: CPT

## 2020-11-24 PROCEDURE — 94727 GAS DIL/WSHOT DETER LNG VOL: CPT

## 2020-11-24 PROCEDURE — 94729 DIFFUSING CAPACITY: CPT

## 2021-02-11 ENCOUNTER — APPOINTMENT (OUTPATIENT)
Dept: PULMONOLOGY | Facility: CLINIC | Age: 61
End: 2021-02-11

## 2021-02-16 ENCOUNTER — APPOINTMENT (OUTPATIENT)
Dept: PULMONOLOGY | Facility: CLINIC | Age: 61
End: 2021-02-16

## 2021-02-22 ENCOUNTER — NON-APPOINTMENT (OUTPATIENT)
Age: 61
End: 2021-02-22

## 2021-02-22 ENCOUNTER — APPOINTMENT (OUTPATIENT)
Dept: PULMONOLOGY | Facility: CLINIC | Age: 61
End: 2021-02-22
Payer: COMMERCIAL

## 2021-02-22 VITALS
SYSTOLIC BLOOD PRESSURE: 124 MMHG | TEMPERATURE: 98 F | DIASTOLIC BLOOD PRESSURE: 73 MMHG | HEART RATE: 64 BPM | OXYGEN SATURATION: 97 %

## 2021-02-22 LAB
ALBUMIN SERPL ELPH-MCNC: 4.4 G/DL
ALP BLD-CCNC: 89 U/L
ALT SERPL-CCNC: 17 U/L
ANION GAP SERPL CALC-SCNC: 10 MMOL/L
AST SERPL-CCNC: 22 U/L
BASOPHILS # BLD AUTO: 0.04 K/UL
BASOPHILS NFR BLD AUTO: 1 %
BILIRUB SERPL-MCNC: 0.4 MG/DL
BUN SERPL-MCNC: 9 MG/DL
CALCIUM SERPL-MCNC: 9.2 MG/DL
CHLORIDE SERPL-SCNC: 102 MMOL/L
CO2 SERPL-SCNC: 25 MMOL/L
CREAT SERPL-MCNC: 1.2 MG/DL
EOSINOPHIL # BLD AUTO: 0.2 K/UL
EOSINOPHIL NFR BLD AUTO: 4.9 %
GLUCOSE SERPL-MCNC: 114 MG/DL
HCT VFR BLD CALC: 41.6 %
HGB BLD-MCNC: 13 G/DL
IMM GRANULOCYTES NFR BLD AUTO: 0.2 %
LYMPHOCYTES # BLD AUTO: 1.06 K/UL
LYMPHOCYTES NFR BLD AUTO: 25.8 %
MAN DIFF?: NORMAL
MCHC RBC-ENTMCNC: 24 PG
MCHC RBC-ENTMCNC: 31.3 GM/DL
MCV RBC AUTO: 76.9 FL
MONOCYTES # BLD AUTO: 0.5 K/UL
MONOCYTES NFR BLD AUTO: 12.2 %
NEUTROPHILS # BLD AUTO: 2.3 K/UL
NEUTROPHILS NFR BLD AUTO: 55.9 %
PLATELET # BLD AUTO: 161 K/UL
POTASSIUM SERPL-SCNC: 4.4 MMOL/L
PROT SERPL-MCNC: 7.6 G/DL
RBC # BLD: 5.41 M/UL
RBC # FLD: 14.6 %
SODIUM SERPL-SCNC: 137 MMOL/L
WBC # FLD AUTO: 4.11 K/UL

## 2021-02-22 PROCEDURE — 99214 OFFICE O/P EST MOD 30 MIN: CPT | Mod: 25

## 2021-02-22 PROCEDURE — 99072 ADDL SUPL MATRL&STAF TM PHE: CPT

## 2021-02-22 PROCEDURE — 36415 COLL VENOUS BLD VENIPUNCTURE: CPT

## 2021-02-22 NOTE — PROCEDURE
[FreeTextEntry1] : Chest x-ray PA lateral July 14, 2020\par Normal cardiac size extensive bilateral opacities with more consolidation right upper lateral lung zone\par Compared to a chest x-ray of May 29, 2019 there is no gross interval change identified\par Impression consistent with known diagnosis of stage IV pulmonary sarcoidosis\par \par PFT February 18, 2020\par Very minimal reduction in flow rates\par No bronchodilator response\par Total capacity 77% of predicted cannot exclude a very mild restrictive component\par Severe reduction diffusion 49% predicted with a loss of functioning alveolocapillary units\par \par PFT  body box August 27, 2019\par Spirometry normal\par No response to bronchodilator at FEV1\par Lung volumes normal with total lung capacity 81% predicted.\par Resistance and specific conductance normal.\par Moderate reduction diffusion 54% predicted with a loss of functioning alveolocapillary units\par No decline in pulmonary physiology\par \par Chest x-ray PA lateral projection May 29, 2019\par Cardiac size normal\par Findings consistent with multiple bilateral nodularity interstitial lung disease consistent with known diagnosis of sarcoidosis\par Impression stage III–4 radiographic features of sarcoidosis\par \par Blood Draw\par CBC comprehensive metabolic profile ACE level\par Data review August 28, 2019\par CBC White blood count 4.86 hemoglobin 14.7 hematocrit 47.1\par Platelet count 183,000\par Serum potassium 5.4\par BUN 9 creatinine 0.92\par Liver function testing normal\par Total protein normal 8.2 ACE level pending\par  ACE elevated 156 but decline from prior 171\par \par Echocardiogram completed February 18, 2020\par Unremarkable study with no reported pulmonary hypertension

## 2021-02-22 NOTE — DISCUSSION/SUMMARY
[FreeTextEntry1] : \par Sarcoidosis stage  4  radiographically\par No  active  respiratory  sxs \par Chest XRAY yearly\par  3 mos f/u with transflow\par March 2021 ECHO  Dr. Frank  negative for pulmonary hypertension or valvular heart disease\par declined  flu  vaccine\par Advised risk  benefit  re COVID  Vaccine

## 2021-02-22 NOTE — HISTORY OF PRESENT ILLNESS
[Stable] : are stable [Difficulty Breathing During Exertion] : denies dyspnea on exertion [Feelings Of Weakness On Exertion] : denies exercise intolerance [Cough] : denies coughing [Regional Soft Tissue Swelling Both Lower Extremities] : denies lower extremity edema [Wheezing] : denies wheezing [Chest Pain Or Discomfort] : denies chest pain [Fever] : denies fever [Date: ___] : was performed [unfilled] [Wt Gain ___ Lbs] : no recent weight gain [Wt Loss ___ Lbs] : no recent weight loss [Oxygen] : the patient uses no supplemental oxygen [de-identified] : extensive finding consistent with sarcoidosis [FreeTextEntry1] : Patient with long-standing significant biopsy-proven sarcoidosis pulmonary and skin.\par No active symptoms of chest pain pleuritic chest pain fevers chills or night sweats purulent sputum. Denies hemoptysis. No increased shortness of breath. No cough or wheeze noted. No recent U. upper respiratory infections.\par \par Status post CAT scan the chest May 12, 2017 with no interval change compared to prior scan of April 6, 2016 able to demonstrate right upper lobe consolidation with no significant change left apical consolidation with no significant change. Innumerable pleural and parenchymal nodularity and nodules but again no change in size or number reported.\par Mediastinum demonstrates prominent mediastinal hilar axillary lymphadenopathy as well as subcarinal adenopathy or reported unchanged. There was some component of partial calcification of the mediastinal and hilar lymph node\par \par ADDENDUM\par 2/20\par Ophthalmology /2/2020 - pt states completed thi s year Jan\par No reported uveitis due to sarcoidosis\par

## 2021-02-22 NOTE — REVIEW OF SYSTEMS
[As Noted in HPI] : as noted in HPI [Negative] : Pulmonary Hypertension [de-identified] : left upper vshoulder sarcoid skin lesion bx proven

## 2021-02-22 NOTE — PHYSICAL EXAM
[Normal Appearance] : normal appearance [General Appearance - Well Developed] : well developed [General Appearance - Well Nourished] : well nourished [Well Groomed] : well groomed [No Deformities] : no deformities [General Appearance - In No Acute Distress] : no acute distress [Normal Conjunctiva] : the conjunctiva exhibited no abnormalities [Eyelids - No Xanthelasma] : the eyelids demonstrated no xanthelasmas [Normal Oropharynx] : normal oropharynx [Neck Appearance] : the appearance of the neck was normal [Neck Cervical Mass (___cm)] : no neck mass was observed [Jugular Venous Distention Increased] : there was no jugular-venous distention [Thyroid Diffuse Enlargement] : the thyroid was not enlarged [Thyroid Nodule] : there were no palpable thyroid nodules [Heart Rate And Rhythm] : heart rate and rhythm were normal [Heart Sounds] : normal S1 and S2 [Murmurs] : no murmurs present [Respiration, Rhythm And Depth] : normal respiratory rhythm and effort [Exaggerated Use Of Accessory Muscles For Inspiration] : no accessory muscle use [Lungs Percussion] : the lungs were normal to percussion [Auscultation Breath Sounds / Voice Sounds] : lungs were clear to auscultation bilaterally [Bowel Sounds] : normal bowel sounds [Abdomen Soft] : soft [Abdomen Tenderness] : non-tender [Abdomen Mass (___ Cm)] : no abdominal mass palpated [Nail Clubbing] : no clubbing of the fingernails [Petechial Hemorrhages (___cm)] : no petechial hemorrhages [Cyanosis, Localized] : no localized cyanosis [] : no ischemic changes [Deep Tendon Reflexes (DTR)] : deep tendon reflexes were 2+ and symmetric [Sensation] : the sensory exam was normal to light touch and pinprick [No Focal Deficits] : no focal deficits [Oriented To Time, Place, And Person] : oriented to person, place, and time [Impaired Insight] : insight and judgment were intact [Affect] : the affect was normal [FreeTextEntry1] : chronic sarcoid rash Left posterior  thorax

## 2021-02-23 LAB — ACE BLD-CCNC: 187 U/L

## 2021-02-24 ENCOUNTER — APPOINTMENT (OUTPATIENT)
Dept: CARDIOLOGY | Facility: CLINIC | Age: 61
End: 2021-02-24
Payer: COMMERCIAL

## 2021-02-24 PROCEDURE — 93306 TTE W/DOPPLER COMPLETE: CPT

## 2021-02-24 PROCEDURE — 99072 ADDL SUPL MATRL&STAF TM PHE: CPT

## 2021-03-29 ENCOUNTER — TRANSCRIPTION ENCOUNTER (OUTPATIENT)
Age: 61
End: 2021-03-29

## 2021-04-02 ENCOUNTER — APPOINTMENT (OUTPATIENT)
Dept: DISASTER EMERGENCY | Facility: CLINIC | Age: 61
End: 2021-04-02

## 2021-04-02 ENCOUNTER — LABORATORY RESULT (OUTPATIENT)
Age: 61
End: 2021-04-02

## 2021-04-04 ENCOUNTER — NON-APPOINTMENT (OUTPATIENT)
Age: 61
End: 2021-04-04

## 2021-04-05 ENCOUNTER — APPOINTMENT (OUTPATIENT)
Dept: PULMONOLOGY | Facility: CLINIC | Age: 61
End: 2021-04-05
Payer: COMMERCIAL

## 2021-04-05 VITALS
TEMPERATURE: 98.7 F | RESPIRATION RATE: 14 BRPM | DIASTOLIC BLOOD PRESSURE: 52 MMHG | BODY MASS INDEX: 25.18 KG/M2 | OXYGEN SATURATION: 99 % | WEIGHT: 170 LBS | SYSTOLIC BLOOD PRESSURE: 100 MMHG | HEIGHT: 69 IN | HEART RATE: 60 BPM

## 2021-04-05 PROCEDURE — 99072 ADDL SUPL MATRL&STAF TM PHE: CPT

## 2021-04-05 PROCEDURE — 94010 BREATHING CAPACITY TEST: CPT

## 2021-04-05 PROCEDURE — 99214 OFFICE O/P EST MOD 30 MIN: CPT | Mod: 25

## 2021-04-05 PROCEDURE — ZZZZZ: CPT

## 2021-04-05 PROCEDURE — 88738 HGB QUANT TRANSCUTANEOUS: CPT

## 2021-04-05 PROCEDURE — 94727 GAS DIL/WSHOT DETER LNG VOL: CPT

## 2021-04-05 PROCEDURE — 94729 DIFFUSING CAPACITY: CPT

## 2021-04-05 PROCEDURE — 95012 NITRIC OXIDE EXP GAS DETER: CPT

## 2021-04-05 NOTE — PHYSICAL EXAM
[General Appearance - Well Developed] : well developed [Normal Appearance] : normal appearance [Well Groomed] : well groomed [General Appearance - Well Nourished] : well nourished [No Deformities] : no deformities [General Appearance - In No Acute Distress] : no acute distress [Normal Conjunctiva] : the conjunctiva exhibited no abnormalities [Eyelids - No Xanthelasma] : the eyelids demonstrated no xanthelasmas [Normal Oropharynx] : normal oropharynx [Neck Appearance] : the appearance of the neck was normal [Neck Cervical Mass (___cm)] : no neck mass was observed [Jugular Venous Distention Increased] : there was no jugular-venous distention [Thyroid Diffuse Enlargement] : the thyroid was not enlarged [Thyroid Nodule] : there were no palpable thyroid nodules [Heart Rate And Rhythm] : heart rate and rhythm were normal [Heart Sounds] : normal S1 and S2 [Murmurs] : no murmurs present [Respiration, Rhythm And Depth] : normal respiratory rhythm and effort [Exaggerated Use Of Accessory Muscles For Inspiration] : no accessory muscle use [Auscultation Breath Sounds / Voice Sounds] : lungs were clear to auscultation bilaterally [Lungs Percussion] : the lungs were normal to percussion [Bowel Sounds] : normal bowel sounds [Abdomen Soft] : soft [Abdomen Tenderness] : non-tender [Abdomen Mass (___ Cm)] : no abdominal mass palpated [Nail Clubbing] : no clubbing of the fingernails [Cyanosis, Localized] : no localized cyanosis [Petechial Hemorrhages (___cm)] : no petechial hemorrhages [] : no ischemic changes [FreeTextEntry1] : chronic sarcoid rash Left posterior  thorax [Deep Tendon Reflexes (DTR)] : deep tendon reflexes were 2+ and symmetric [Sensation] : the sensory exam was normal to light touch and pinprick [No Focal Deficits] : no focal deficits [Oriented To Time, Place, And Person] : oriented to person, place, and time [Impaired Insight] : insight and judgment were intact [Affect] : the affect was normal

## 2021-04-05 NOTE — REVIEW OF SYSTEMS
[As Noted in HPI] : as noted in HPI [Negative] : Pulmonary Hypertension [de-identified] : left upper vshoulder sarcoid skin lesion bx proven

## 2021-04-05 NOTE — HISTORY OF PRESENT ILLNESS
[Stable] : are stable [Difficulty Breathing During Exertion] : denies dyspnea on exertion [Feelings Of Weakness On Exertion] : denies exercise intolerance [Cough] : denies coughing [Wheezing] : denies wheezing [Regional Soft Tissue Swelling Both Lower Extremities] : denies lower extremity edema [Chest Pain Or Discomfort] : denies chest pain [Fever] : denies fever [Wt Gain ___ Lbs] : no recent weight gain [Wt Loss ___ Lbs] : no recent weight loss [Oxygen] : the patient uses no supplemental oxygen [Date: ___] : was performed [unfilled] [de-identified] : extensive finding consistent with sarcoidosis [FreeTextEntry1] : Patient with long-standing significant biopsy-proven sarcoidosis pulmonary and skin.\par No active symptoms of chest pain pleuritic chest pain fevers chills or night sweats purulent sputum. Denies hemoptysis. No increased shortness of breath. No cough or wheeze noted. No recent U. upper respiratory infections.\par \par Status post CAT scan the chest May 12, 2017 with no interval change compared to prior scan of April 6, 2016 able to demonstrate right upper lobe consolidation with no significant change left apical consolidation with no significant change. Innumerable pleural and parenchymal nodularity and nodules but again no change in size or number reported.\par Mediastinum demonstrates prominent mediastinal hilar axillary lymphadenopathy as well as subcarinal adenopathy or reported unchanged. There was some component of partial calcification of the mediastinal and hilar lymph node\par \par ADDENDUM\par 2/20\par Ophthalmology /2/2020 - pt states completed thi s year Jan\par No reported uveitis due to sarcoidosis\par

## 2021-04-05 NOTE — PROCEDURE
[FreeTextEntry1] : PFT 4/5/21\par Mild restrictive  ventilatory impairment\par severe reduction DLCO  with loss fx  alveolar  capillary units\par HGB 14.6\par NIOX   28  ppb minimal elevation 4/5/21\par \par Chest x-ray PA lateral July 14, 2020\par Normal cardiac size extensive bilateral opacities with more consolidation right upper lateral lung zone\par Compared to a chest x-ray of May 29, 2019 there is no gross interval change identified\par Impression consistent with known diagnosis of stage IV pulmonary sarcoidosis\par \par PFT February 18, 2020\par Very minimal reduction in flow rates\par No bronchodilator response\par Total capacity 77% of predicted cannot exclude a very mild restrictive component\par Severe reduction diffusion 49% predicted with a loss of functioning alveolocapillary units\par \par PFT  body box August 27, 2019\par Spirometry normal\par No response to bronchodilator at FEV1\par Lung volumes normal with total lung capacity 81% predicted.\par Resistance and specific conductance normal.\par Moderate reduction diffusion 54% predicted with a loss of functioning alveolocapillary units\par No decline in pulmonary physiology\par \par Chest x-ray PA lateral projection May 29, 2019\par Cardiac size normal\par Findings consistent with multiple bilateral nodularity interstitial lung disease consistent with known diagnosis of sarcoidosis\par Impression stage III–4 radiographic features of sarcoidosis\par \par Blood Draw\par CBC comprehensive metabolic profile ACE level\par Data review August 28, 2019\par CBC White blood count 4.86 hemoglobin 14.7 hematocrit 47.1\par Platelet count 183,000\par Serum potassium 5.4\par BUN 9 creatinine 0.92\par Liver function testing normal\par Total protein normal 8.2 ACE level pending\par  ACE elevated 156 but decline from prior 171\par \par Echocardiogram completed February 18, 2020\par Unremarkable study with no reported pulmonary hypertension

## 2021-04-05 NOTE — DISCUSSION/SUMMARY
[FreeTextEntry1] : \par Sarcoidosis stage  4  radiographically\par No  active  respiratory  sxs \par Chest XRAY yearly- July 2021\par  3 mos f/u with transflow\par March 2021 ECHO  Dr. Frank  negative for pulmonary hypertension or valvular heart disease\par declined  flu  vaccine\par Advised risk  benefit  re COVID  Vaccine\par OPTH up to date Dr Alan Jan 2021

## 2021-07-12 ENCOUNTER — APPOINTMENT (OUTPATIENT)
Dept: PULMONOLOGY | Facility: CLINIC | Age: 61
End: 2021-07-12
Payer: COMMERCIAL

## 2021-07-12 VITALS
SYSTOLIC BLOOD PRESSURE: 102 MMHG | HEART RATE: 71 BPM | OXYGEN SATURATION: 97 % | DIASTOLIC BLOOD PRESSURE: 68 MMHG | TEMPERATURE: 97.6 F

## 2021-07-12 PROCEDURE — 99072 ADDL SUPL MATRL&STAF TM PHE: CPT

## 2021-07-12 PROCEDURE — 99214 OFFICE O/P EST MOD 30 MIN: CPT | Mod: 25

## 2021-07-12 PROCEDURE — 71046 X-RAY EXAM CHEST 2 VIEWS: CPT

## 2021-07-12 RX ORDER — TADALAFIL 20 MG/1
20 TABLET ORAL
Qty: 6 | Refills: 0 | Status: ACTIVE | COMMUNITY
Start: 2021-03-05

## 2021-07-12 RX ORDER — ACETAMINOPHEN AND CODEINE 300; 30 MG/1; MG/1
300-30 TABLET ORAL
Qty: 14 | Refills: 0 | Status: ACTIVE | COMMUNITY
Start: 2021-05-10

## 2021-07-12 NOTE — REVIEW OF SYSTEMS
[As Noted in HPI] : as noted in HPI [Negative] : Pulmonary Hypertension [de-identified] : left upper vshoulder sarcoid skin lesion bx proven

## 2021-07-12 NOTE — HISTORY OF PRESENT ILLNESS
[Stable] : are stable [Difficulty Breathing During Exertion] : denies dyspnea on exertion [Feelings Of Weakness On Exertion] : denies exercise intolerance [Cough] : denies coughing [Wheezing] : denies wheezing [Regional Soft Tissue Swelling Both Lower Extremities] : denies lower extremity edema [Chest Pain Or Discomfort] : denies chest pain [Fever] : denies fever [Date: ___] : was performed [unfilled] [Wt Gain ___ Lbs] : no recent weight gain [Wt Loss ___ Lbs] : no recent weight loss [Oxygen] : the patient uses no supplemental oxygen [de-identified] : extensive finding consistent with sarcoidosis [FreeTextEntry1] : Not yet received COVID Vaccie\par \par Patient with long-standing significant biopsy-proven sarcoidosis pulmonary and skin.\par No active symptoms of chest pain pleuritic chest pain fevers chills or night sweats purulent sputum. Denies hemoptysis. No increased shortness of breath. No cough or wheeze noted. No recent U. upper respiratory infections.\par \par Status post CAT scan the chest May 12, 2017 with no interval change compared to prior scan of April 6, 2016 able to demonstrate right upper lobe consolidation with no significant change left apical consolidation with no significant change. Innumerable pleural and parenchymal nodularity and nodules but again no change in size or number reported.\par Mediastinum demonstrates prominent mediastinal hilar axillary lymphadenopathy as well as subcarinal adenopathy or reported unchanged. There was some component of partial calcification of the mediastinal and hilar lymph node\par \par ADDENDUM\par 2/20\par Ophthalmology /2/2020 - pt states completed thi s year Jan\par No reported uveitis due to sarcoidosis\par

## 2021-07-12 NOTE — REVIEW OF SYSTEMS
[As Noted in HPI] : as noted in HPI [Negative] : Pulmonary Hypertension [de-identified] : left upper vshoulder sarcoid skin lesion bx proven

## 2021-07-12 NOTE — HISTORY OF PRESENT ILLNESS
[Stable] : are stable [Difficulty Breathing During Exertion] : denies dyspnea on exertion [Feelings Of Weakness On Exertion] : denies exercise intolerance [Cough] : denies coughing [Wheezing] : denies wheezing [Regional Soft Tissue Swelling Both Lower Extremities] : denies lower extremity edema [Chest Pain Or Discomfort] : denies chest pain [Fever] : denies fever [Date: ___] : was performed [unfilled] [Wt Gain ___ Lbs] : no recent weight gain [Wt Loss ___ Lbs] : no recent weight loss [Oxygen] : the patient uses no supplemental oxygen [de-identified] : extensive finding consistent with sarcoidosis [FreeTextEntry1] : Not yet received COVID Vaccie\par \par Patient with long-standing significant biopsy-proven sarcoidosis pulmonary and skin.\par No active symptoms of chest pain pleuritic chest pain fevers chills or night sweats purulent sputum. Denies hemoptysis. No increased shortness of breath. No cough or wheeze noted. No recent U. upper respiratory infections.\par \par Status post CAT scan the chest May 12, 2017 with no interval change compared to prior scan of April 6, 2016 able to demonstrate right upper lobe consolidation with no significant change left apical consolidation with no significant change. Innumerable pleural and parenchymal nodularity and nodules but again no change in size or number reported.\par Mediastinum demonstrates prominent mediastinal hilar axillary lymphadenopathy as well as subcarinal adenopathy or reported unchanged. There was some component of partial calcification of the mediastinal and hilar lymph node\par \par ADDENDUM\par 2/20\par Ophthalmology /2/2020 - pt states completed thi s year Jan\par No reported uveitis due to sarcoidosis\par

## 2021-07-12 NOTE — PROCEDURE
[FreeTextEntry1] : PFT 4/5/21\par Mild restrictive  ventilatory impairment\par severe reduction DLCO  with loss fx  alveolar  capillary units\par HGB 14.6\par NIOX   28  ppb minimal elevation 4/5/21\par \par Chest x-ray PA lateral\par July 12, 2021\par Normal cardiac size\par Extensive bilateral parenchymal opacities with right upper lobe component of consolidation\par Consistent with known diagnosis of sarcoidosis\par No interval change compared to chest x-ray July 14, 2020\par \par Chest x-ray PA lateral July 14, 2020\par Normal cardiac size extensive bilateral opacities with more consolidation right upper lateral lung zone\par Compared to a chest x-ray of May 29, 2019 there is no gross interval change identified\par Impression consistent with known diagnosis of stage IV pulmonary sarcoidosis\par \par PFT February 18, 2020\par Very minimal reduction in flow rates\par No bronchodilator response\par Total capacity 77% of predicted cannot exclude a very mild restrictive component\par Severe reduction diffusion 49% predicted with a loss of functioning alveolocapillary units\par \par PFT  body box August 27, 2019\par Spirometry normal\par No response to bronchodilator at FEV1\par Lung volumes normal with total lung capacity 81% predicted.\par Resistance and specific conductance normal.\par Moderate reduction diffusion 54% predicted with a loss of functioning alveolocapillary units\par No decline in pulmonary physiology\par \par Chest x-ray PA lateral projection May 29, 2019\par Cardiac size normal\par Findings consistent with multiple bilateral nodularity interstitial lung disease consistent with known diagnosis of sarcoidosis\par Impression stage III–4 radiographic features of sarcoidosis\par \par Blood Draw\par CBC comprehensive metabolic profile ACE level\par Data review August 28, 2019\par CBC White blood count 4.86 hemoglobin 14.7 hematocrit 47.1\par Platelet count 183,000\par Serum potassium 5.4\par BUN 9 creatinine 0.92\par Liver function testing normal\par Total protein normal 8.2 ACE level pending\par  ACE elevated 156 but decline from prior 171\par \par Echocardiogram completed February 18, 2020\par Unremarkable study with no reported pulmonary hypertension

## 2021-07-12 NOTE — DISCUSSION/SUMMARY
[FreeTextEntry1] : \par Sarcoidosis stage  4  radiographically\par No  active  respiratory  sxs \par Chest XRAY yearly- July 2021\par  3 mos f/u with transflow\par March 2021 ECHO  Dr. Frank  negative for pulmonary hypertension or valvular heart disease\par declined  flu  vaccine in the past\par Advised risk  benefit  re COVID  Vaccine\par OPTH up to date Dr Alan Jan 2021

## 2021-08-06 ENCOUNTER — APPOINTMENT (OUTPATIENT)
Dept: DISASTER EMERGENCY | Facility: CLINIC | Age: 61
End: 2021-08-06

## 2021-08-07 LAB — SARS-COV-2 N GENE NPH QL NAA+PROBE: NOT DETECTED

## 2021-08-09 ENCOUNTER — APPOINTMENT (OUTPATIENT)
Dept: PULMONOLOGY | Facility: CLINIC | Age: 61
End: 2021-08-09
Payer: COMMERCIAL

## 2021-08-09 VITALS
SYSTOLIC BLOOD PRESSURE: 111 MMHG | RESPIRATION RATE: 14 BRPM | OXYGEN SATURATION: 95 % | DIASTOLIC BLOOD PRESSURE: 77 MMHG | TEMPERATURE: 97.6 F | HEART RATE: 80 BPM

## 2021-08-09 PROCEDURE — 94727 GAS DIL/WSHOT DETER LNG VOL: CPT

## 2021-08-09 PROCEDURE — 94729 DIFFUSING CAPACITY: CPT

## 2021-08-09 PROCEDURE — 94010 BREATHING CAPACITY TEST: CPT

## 2021-08-09 PROCEDURE — 88738 HGB QUANT TRANSCUTANEOUS: CPT

## 2021-08-09 PROCEDURE — 99214 OFFICE O/P EST MOD 30 MIN: CPT | Mod: 25

## 2021-08-09 PROCEDURE — ZZZZZ: CPT

## 2021-08-09 PROCEDURE — 95012 NITRIC OXIDE EXP GAS DETER: CPT

## 2021-08-09 NOTE — PROCEDURE
[FreeTextEntry1] : PFT 8/9/21\par Normal  flow Rates\par  TLC 64 % mild moderate redeuction\par  DLCO 48 % with severe gas exchange impairment\par HGB 15.0\par NIOX 32 ppb mild bronchial inflammation 8/9/21\par \par PFT 4/5/21\par Mild restrictive  ventilatory impairment\par severe reduction DLCO  with loss fx  alveolar  capillary units\par HGB 14.6\par NIOX   28  ppb minimal elevation 4/5/21\par \par Chest x-ray PA lateral\par July 12, 2021\par Normal cardiac size\par Extensive bilateral parenchymal opacities with right upper lobe component of consolidation\par Consistent with known diagnosis of sarcoidosis\par No interval change compared to chest x-ray July 14, 2020\par \par Chest x-ray PA lateral July 14, 2020\par Normal cardiac size extensive bilateral opacities with more consolidation right upper lateral lung zone\par Compared to a chest x-ray of May 29, 2019 there is no gross interval change identified\par Impression consistent with known diagnosis of stage IV pulmonary sarcoidosis\par \par PFT February 18, 2020\par Very minimal reduction in flow rates\par No bronchodilator response\par Total capacity 77% of predicted cannot exclude a very mild restrictive component\par Severe reduction diffusion 49% predicted with a loss of functioning alveolocapillary units\par \par PFT  body box August 27, 2019\par Spirometry normal\par No response to bronchodilator at FEV1\par Lung volumes normal with total lung capacity 81% predicted.\par Resistance and specific conductance normal.\par Moderate reduction diffusion 54% predicted with a loss of functioning alveolocapillary units\par No decline in pulmonary physiology\par \par Chest x-ray PA lateral projection May 29, 2019\par Cardiac size normal\par Findings consistent with multiple bilateral nodularity interstitial lung disease consistent with known diagnosis of sarcoidosis\par Impression stage III–4 radiographic features of sarcoidosis\par \par Blood Draw\par CBC comprehensive metabolic profile ACE level\par Data review August 28, 2019\par CBC White blood count 4.86 hemoglobin 14.7 hematocrit 47.1\par Platelet count 183,000\par Serum potassium 5.4\par BUN 9 creatinine 0.92\par Liver function testing normal\par Total protein normal 8.2 ACE level pending\par  ACE elevated 156 but decline from prior 171\par \par Echocardiogram completed February 18, 2020\par Unremarkable study with no reported pulmonary hypertension

## 2021-08-09 NOTE — HISTORY OF PRESENT ILLNESS
[Stable] : are stable [Difficulty Breathing During Exertion] : denies dyspnea on exertion [Feelings Of Weakness On Exertion] : denies exercise intolerance [Cough] : denies coughing [Wheezing] : denies wheezing [Regional Soft Tissue Swelling Both Lower Extremities] : denies lower extremity edema [Chest Pain Or Discomfort] : denies chest pain [Fever] : denies fever [Date: ___] : was performed [unfilled] [Wt Gain ___ Lbs] : no recent weight gain [Wt Loss ___ Lbs] : no recent weight loss [Oxygen] : the patient uses no supplemental oxygen [de-identified] : extensive finding consistent with sarcoidosis [FreeTextEntry1] : Not yet received COVID Vaccie\par \par Patient with long-standing significant biopsy-proven sarcoidosis pulmonary and skin.\par No active symptoms of chest pain pleuritic chest pain fevers chills or night sweats purulent sputum. Denies hemoptysis. No increased shortness of breath. No cough or wheeze noted. No recent U. upper respiratory infections.\par \par Status post CAT scan the chest May 12, 2017 with no interval change compared to prior scan of April 6, 2016 able to demonstrate right upper lobe consolidation with no significant change left apical consolidation with no significant change. Innumerable pleural and parenchymal nodularity and nodules but again no change in size or number reported.\par Mediastinum demonstrates prominent mediastinal hilar axillary lymphadenopathy as well as subcarinal adenopathy or reported unchanged. There was some component of partial calcification of the mediastinal and hilar lymph node\par \par ADDENDUM\par 2/20\par Ophthalmology /2/2020 - pt states completed thi s year Jan\par No reported uveitis due to sarcoidosis\par

## 2021-08-09 NOTE — DISCUSSION/SUMMARY
[FreeTextEntry1] : \par Sarcoidosis stage  4  radiographically\par No  active  respiratory  sxs \par Chest XRAY yearly- July 2022\par  3 mos f/u with transflow\par March 2021 ECHO  Dr. Frank  negative for pulmonary hypertension or valvular heart disease\par declined  flu  vaccine in the past\par Advised risk  benefit  re COVID  Vaccine\par OPTH up to date Dr Alan Jan 2021\par COVID Vaccine  recommended MODERNA   1 st dose pending 2 nd dose

## 2021-08-09 NOTE — REVIEW OF SYSTEMS
[As Noted in HPI] : as noted in HPI [Negative] : Pulmonary Hypertension [de-identified] : left upper vshoulder sarcoid skin lesion bx proven

## 2021-11-05 ENCOUNTER — APPOINTMENT (OUTPATIENT)
Dept: DISASTER EMERGENCY | Facility: CLINIC | Age: 61
End: 2021-11-05

## 2021-11-05 LAB — SARS-COV-2 N GENE NPH QL NAA+PROBE: NOT DETECTED

## 2021-11-08 ENCOUNTER — APPOINTMENT (OUTPATIENT)
Dept: PULMONOLOGY | Facility: CLINIC | Age: 61
End: 2021-11-08
Payer: COMMERCIAL

## 2021-11-08 LAB — POCT - HEMOGLOBIN (HGB), QUANTITATIVE, TRANSCUTANEOUS: 14.3

## 2021-11-08 PROCEDURE — ZZZZZ: CPT

## 2021-11-08 PROCEDURE — 94010 BREATHING CAPACITY TEST: CPT

## 2021-11-08 PROCEDURE — 94727 GAS DIL/WSHOT DETER LNG VOL: CPT

## 2021-11-08 PROCEDURE — 99214 OFFICE O/P EST MOD 30 MIN: CPT | Mod: 25

## 2021-11-08 PROCEDURE — 88738 HGB QUANT TRANSCUTANEOUS: CPT

## 2021-11-08 PROCEDURE — 94729 DIFFUSING CAPACITY: CPT

## 2021-11-08 RX ORDER — LIDOCAINE 36 MG/1
1.8 PATCH TOPICAL
Qty: 30 | Refills: 0 | Status: ACTIVE | COMMUNITY
Start: 2021-10-19

## 2021-11-08 NOTE — HISTORY OF PRESENT ILLNESS
[Stable] : are stable [Difficulty Breathing During Exertion] : denies dyspnea on exertion [Feelings Of Weakness On Exertion] : denies exercise intolerance [Cough] : denies coughing [Wheezing] : denies wheezing [Regional Soft Tissue Swelling Both Lower Extremities] : denies lower extremity edema [Chest Pain Or Discomfort] : denies chest pain [Fever] : denies fever [Date: ___] : was performed [unfilled] [Wt Gain ___ Lbs] : no recent weight gain [Wt Loss ___ Lbs] : no recent weight loss [Oxygen] : the patient uses no supplemental oxygen [de-identified] : extensive finding consistent with sarcoidosis [FreeTextEntry1] : Not yet received COVID Vaccie\par \par Patient with long-standing significant biopsy-proven sarcoidosis pulmonary and skin.\par No active symptoms of chest pain pleuritic chest pain fevers chills or night sweats purulent sputum. Denies hemoptysis. No increased shortness of breath. No cough or wheeze noted. No recent U. upper respiratory infections.\par \par Status post CAT scan the chest May 12, 2017 with no interval change compared to prior scan of April 6, 2016 able to demonstrate right upper lobe consolidation with no significant change left apical consolidation with no significant change. Innumerable pleural and parenchymal nodularity and nodules but again no change in size or number reported.\par Mediastinum demonstrates prominent mediastinal hilar axillary lymphadenopathy as well as subcarinal adenopathy or reported unchanged. There was some component of partial calcification of the mediastinal and hilar lymph node\par \par ADDENDUM\par 2/20\par Ophthalmology /2/2020 - pt states completed thi s year Jan\par No reported uveitis due to sarcoidosis\par

## 2021-11-08 NOTE — DISCUSSION/SUMMARY
[FreeTextEntry1] : \par Sarcoidosis stage  4  radiographically\par ABNL PFT with severe Diffusion abnormality\par Chest XRAY yearly- July 2022\par  3 mos f/u with transflow\par March 2021 ECHO  Dr. Frank  negative for pulmonary hypertension or valvular heart disease\par declined  flu  vaccine in the past\par Advised risk  benefit  re COVID  Vaccine\par OPTH up to date Dr Alan Jan 2021\par COVID Vaccine  recommended MODERNA  protocol completed\par declined flu  vaccine

## 2021-11-08 NOTE — REVIEW OF SYSTEMS
[As Noted in HPI] : as noted in HPI [Negative] : Pulmonary Hypertension [de-identified] : left upper vshoulder sarcoid skin lesion bx proven

## 2022-02-09 ENCOUNTER — FORM ENCOUNTER (OUTPATIENT)
Age: 62
End: 2022-02-09

## 2022-02-10 ENCOUNTER — APPOINTMENT (OUTPATIENT)
Dept: PULMONOLOGY | Facility: CLINIC | Age: 62
End: 2022-02-10
Payer: COMMERCIAL

## 2022-02-10 ENCOUNTER — APPOINTMENT (OUTPATIENT)
Dept: CARDIOLOGY | Facility: CLINIC | Age: 62
End: 2022-02-10
Payer: COMMERCIAL

## 2022-02-10 VITALS
SYSTOLIC BLOOD PRESSURE: 129 MMHG | OXYGEN SATURATION: 96 % | TEMPERATURE: 98.6 F | HEIGHT: 69 IN | HEART RATE: 92 BPM | BODY MASS INDEX: 25.18 KG/M2 | DIASTOLIC BLOOD PRESSURE: 88 MMHG | WEIGHT: 170 LBS

## 2022-02-10 LAB — POCT - HEMOGLOBIN (HGB), QUANTITATIVE, TRANSCUTANEOUS: 16.8

## 2022-02-10 PROCEDURE — 99214 OFFICE O/P EST MOD 30 MIN: CPT | Mod: 25

## 2022-02-10 PROCEDURE — 94729 DIFFUSING CAPACITY: CPT

## 2022-02-10 PROCEDURE — 94727 GAS DIL/WSHOT DETER LNG VOL: CPT

## 2022-02-10 PROCEDURE — 93306 TTE W/DOPPLER COMPLETE: CPT

## 2022-02-10 PROCEDURE — ZZZZZ: CPT

## 2022-02-10 PROCEDURE — 88738 HGB QUANT TRANSCUTANEOUS: CPT

## 2022-02-10 PROCEDURE — 94010 BREATHING CAPACITY TEST: CPT

## 2022-02-10 NOTE — HISTORY OF PRESENT ILLNESS
[Stable] : are stable [Difficulty Breathing During Exertion] : denies dyspnea on exertion [Feelings Of Weakness On Exertion] : denies exercise intolerance [Cough] : denies coughing [Wheezing] : denies wheezing [Regional Soft Tissue Swelling Both Lower Extremities] : denies lower extremity edema [Chest Pain Or Discomfort] : denies chest pain [Fever] : denies fever [Date: ___] : was performed [unfilled] [Wt Gain ___ Lbs] : no recent weight gain [Wt Loss ___ Lbs] : no recent weight loss [Oxygen] : the patient uses no supplemental oxygen [de-identified] : extensive finding consistent with sarcoidosis [FreeTextEntry1] : COVID vaccinated \par \par Patient with long-standing significant biopsy-proven sarcoidosis pulmonary and skin.\par No active symptoms of chest pain pleuritic chest pain fevers chills or night sweats purulent sputum. Denies hemoptysis. No increased shortness of breath. No cough or wheeze noted. No recent U. upper respiratory infections.\par \par Status post CAT scan the chest May 12, 2017 with no interval change compared to prior scan of April 6, 2016 able to demonstrate right upper lobe consolidation with no significant change left apical consolidation with no significant change. Innumerable pleural and parenchymal nodularity and nodules but again no change in size or number reported.\par Mediastinum demonstrates prominent mediastinal hilar axillary lymphadenopathy as well as subcarinal adenopathy or reported unchanged. There was some component of partial calcification of the mediastinal and hilar lymph node\par \par ADDENDUM\par 2/20\par Ophthalmology /2/2020 - pt states completed thi s year Jan\par No reported uveitis due to sarcoidosis\par

## 2022-02-10 NOTE — REVIEW OF SYSTEMS
[As Noted in HPI] : as noted in HPI [Negative] : Pulmonary Hypertension [de-identified] : left upper vshoulder sarcoid skin lesion bx proven

## 2022-02-10 NOTE — DISCUSSION/SUMMARY
[FreeTextEntry1] : \par Sarcoidosis stage  4  radiographically\par ABNL PFT with severe Diffusion abnormality\par Chest XRAY yearly- July 2022\par  3 mos f/u with transflow\par March 2021 ECHO  Dr. Frank  negative for pulmonary hypertension or valvular heart disease\par declined  flu  vaccine in the past\par Advised risk  benefit  re COVID  Vaccine\par OPTH up to date Dr Alan Jan 2021\par COVID Vaccine  recommended MODERNA  protocol completed Booster 1  month\par declined flu  vaccine
gradual onset

## 2022-02-10 NOTE — PROCEDURE
[FreeTextEntry1] : PFT 2/10/22\par Mild reduction flow rates \par TLC 67 %\par DLCO 43 % \par IMP\par Mild restrictive  ventilatory impairment with severe gas exchange impairment \par HGB 16.8\par NIOC 28 minimal bronchial inflammation 2/20/22\par \par PFT 11/8/2021\par minimal reduction flow rates\par  TLC  74 %\par  DLCO 48 %\par HGB14.3\par \par PFT 8/9/21\par Normal  flow Rates\par  TLC 64 % mild moderate redeuction\par  DLCO 48 % with severe gas exchange impairment\par HGB 15.0\par NIOX 32 ppb mild bronchial inflammation 8/9/21\par \par PFT 4/5/21\par Mild restrictive  ventilatory impairment\par severe reduction DLCO  with loss fx  alveolar  capillary units\par HGB 14.6\par NIOX   28  ppb minimal elevation 4/5/21\par \par Chest x-ray PA lateral\par July 12, 2021\par Normal cardiac size\par Extensive bilateral parenchymal opacities with right upper lobe component of consolidation\par Consistent with known diagnosis of sarcoidosis\par No interval change compared to chest x-ray July 14, 2020\par \par Chest x-ray PA lateral July 14, 2020\par Normal cardiac size extensive bilateral opacities with more consolidation right upper lateral lung zone\par Compared to a chest x-ray of May 29, 2019 there is no gross interval change identified\par Impression consistent with known diagnosis of stage IV pulmonary sarcoidosis\par \par PFT February 18, 2020\par Very minimal reduction in flow rates\par No bronchodilator response\par Total capacity 77% of predicted cannot exclude a very mild restrictive component\par Severe reduction diffusion 49% predicted with a loss of functioning alveolocapillary units\par \par PFT  body box August 27, 2019\par Spirometry normal\par No response to bronchodilator at FEV1\par Lung volumes normal with total lung capacity 81% predicted.\par Resistance and specific conductance normal.\par Moderate reduction diffusion 54% predicted with a loss of functioning alveolocapillary units\par No decline in pulmonary physiology\par \par Chest x-ray PA lateral projection May 29, 2019\par Cardiac size normal\par Findings consistent with multiple bilateral nodularity interstitial lung disease consistent with known diagnosis of sarcoidosis\par Impression stage III–4 radiographic features of sarcoidosis\par \par Blood Draw\par CBC comprehensive metabolic profile ACE level\par Data review August 28, 2019\par CBC White blood count 4.86 hemoglobin 14.7 hematocrit 47.1\par Platelet count 183,000\par Serum potassium 5.4\par BUN 9 creatinine 0.92\par Liver function testing normal\par Total protein normal 8.2 ACE level pending\par  ACE elevated 156 but decline from prior 171\par \par Echocardiogram completed February 18, 2020\par Unremarkable study with no reported pulmonary hypertension

## 2022-04-11 PROBLEM — Z11.59 SCREENING FOR VIRAL DISEASE: Status: ACTIVE | Noted: 2020-07-14

## 2022-06-30 ENCOUNTER — APPOINTMENT (OUTPATIENT)
Dept: PULMONOLOGY | Facility: CLINIC | Age: 62
End: 2022-06-30

## 2022-06-30 VITALS
OXYGEN SATURATION: 98 % | DIASTOLIC BLOOD PRESSURE: 85 MMHG | TEMPERATURE: 98.2 F | HEART RATE: 108 BPM | RESPIRATION RATE: 14 BRPM | HEIGHT: 69 IN | WEIGHT: 170 LBS | BODY MASS INDEX: 25.18 KG/M2 | SYSTOLIC BLOOD PRESSURE: 128 MMHG

## 2022-06-30 LAB — POCT - HEMOGLOBIN (HGB), QUANTITATIVE, TRANSCUTANEOUS: 13.4

## 2022-06-30 PROCEDURE — 94727 GAS DIL/WSHOT DETER LNG VOL: CPT

## 2022-06-30 PROCEDURE — 95012 NITRIC OXIDE EXP GAS DETER: CPT

## 2022-06-30 PROCEDURE — ZZZZZ: CPT

## 2022-06-30 PROCEDURE — 88738 HGB QUANT TRANSCUTANEOUS: CPT

## 2022-06-30 PROCEDURE — 94729 DIFFUSING CAPACITY: CPT

## 2022-06-30 PROCEDURE — 94010 BREATHING CAPACITY TEST: CPT

## 2022-06-30 PROCEDURE — 71046 X-RAY EXAM CHEST 2 VIEWS: CPT

## 2022-06-30 PROCEDURE — 99214 OFFICE O/P EST MOD 30 MIN: CPT | Mod: 25

## 2022-06-30 RX ORDER — TADALAFIL 20 MG/1
20 TABLET ORAL
Qty: 6 | Refills: 0 | Status: ACTIVE | COMMUNITY
Start: 2022-01-14

## 2022-06-30 RX ORDER — NEOMYCIN AND POLYMYXIN B SULFATES AND DEXAMETHASONE 3.5; 10000; 1 MG/G; [IU]/G; MG/G
3.5-10000-0.1 OINTMENT OPHTHALMIC
Qty: 4 | Refills: 0 | Status: DISCONTINUED | COMMUNITY
Start: 2022-01-14

## 2022-06-30 NOTE — DISCUSSION/SUMMARY
[FreeTextEntry1] : \par Sarcoidosis stage  4  radiographically\par ABNL PFT with severe Diffusion abnormality\par Chest XRAY yearly- July 2022\par  3 mos f/u with transflow\par March 2021 ECHO  Dr. Frank  negative for pulmonary hypertension or valvular heart disease\par declined  flu  vaccine in the past\par Advised risk  benefit  re COVID  Vaccine\par OPTH up to date Dr Alan Jan 2021\par COVID Vaccine  recommended MODERNA  protocol completed Booster 1  month\par declined flu  vaccine

## 2022-06-30 NOTE — PROCEDURE
[FreeTextEntry1] : PFT June 30, 2022\par Flow rates normal\par Lung volumes demonstrate a mild reduction at TLC 76% predicted\par Diffusion 50% predicted with a moderate loss of functioning alveolar capillary units\par Hemoglobin 13.4\par Overall interval improvement of pulmonary physiology\par NIOX 25 upper limits of normal\par \par Chest x-ray PA lateral June 30, 2022\par Normal cardiac size\par Right upper lobe conglomerate consolidation greater than left lung with overall abnormalities consistent with known sarcoidosis no interval change dating back to chest x-ray July 12, 2021 by definition minimum late stage III sarcoid disease\par \par PFT 2/10/22\par Mild reduction flow rates \par TLC 67 %\par DLCO 43 % \par IMP\par Mild restrictive  ventilatory impairment with severe gas exchange impairment \par HGB 16.8\par NIOC 28 minimal bronchial inflammation 2/20/22\par \par PFT 11/8/2021\par minimal reduction flow rates\par  TLC  74 %\par  DLCO 48 %\par HGB14.3\par \par PFT 8/9/21\par Normal  flow Rates\par  TLC 64 % mild moderate redeuction\par  DLCO 48 % with severe gas exchange impairment\par HGB 15.0\par NIOX 32 ppb mild bronchial inflammation 8/9/21\par \par PFT 4/5/21\par Mild restrictive  ventilatory impairment\par severe reduction DLCO  with loss fx  alveolar  capillary units\par HGB 14.6\par NIOX   28  ppb minimal elevation 4/5/21\par \par Chest x-ray PA lateral\par July 12, 2021\par Normal cardiac size\par Extensive bilateral parenchymal opacities with right upper lobe component of consolidation\par Consistent with known diagnosis of sarcoidosis\par No interval change compared to chest x-ray July 14, 2020\par \par Chest x-ray PA lateral July 14, 2020\par Normal cardiac size extensive bilateral opacities with more consolidation right upper lateral lung zone\par Compared to a chest x-ray of May 29, 2019 there is no gross interval change identified\par Impression consistent with known diagnosis of stage IV pulmonary sarcoidosis\par \par PFT February 18, 2020\par Very minimal reduction in flow rates\par No bronchodilator response\par Total capacity 77% of predicted cannot exclude a very mild restrictive component\par Severe reduction diffusion 49% predicted with a loss of functioning alveolocapillary units\par \par PFT  body box August 27, 2019\par Spirometry normal\par No response to bronchodilator at FEV1\par Lung volumes normal with total lung capacity 81% predicted.\par Resistance and specific conductance normal.\par Moderate reduction diffusion 54% predicted with a loss of functioning alveolocapillary units\par No decline in pulmonary physiology\par \par Chest x-ray PA lateral projection May 29, 2019\par Cardiac size normal\par Findings consistent with multiple bilateral nodularity interstitial lung disease consistent with known diagnosis of sarcoidosis\par Impression stage III–4 radiographic features of sarcoidosis\par \par Blood Draw\par CBC comprehensive metabolic profile ACE level\par Data review August 28, 2019\par CBC White blood count 4.86 hemoglobin 14.7 hematocrit 47.1\par Platelet count 183,000\par Serum potassium 5.4\par BUN 9 creatinine 0.92\par Liver function testing normal\par Total protein normal 8.2 ACE level pending\par  ACE elevated 156 but decline from prior 171\par \par Echocardiogram completed February 18, 2020\par Unremarkable study with no reported pulmonary hypertension

## 2022-06-30 NOTE — HISTORY OF PRESENT ILLNESS
[Stable] : are stable [Difficulty Breathing During Exertion] : denies dyspnea on exertion [Feelings Of Weakness On Exertion] : denies exercise intolerance [Cough] : denies coughing [Wheezing] : denies wheezing [Chest Pain Or Discomfort] : denies chest pain [Regional Soft Tissue Swelling Both Lower Extremities] : denies lower extremity edema [Fever] : denies fever [Date: ___] : was performed [unfilled] [Wt Gain ___ Lbs] : no recent weight gain [Wt Loss ___ Lbs] : no recent weight loss [Oxygen] : the patient uses no supplemental oxygen [de-identified] : extensive finding consistent with sarcoidosis [FreeTextEntry1] : COVID vaccinated \par \par Patient with long-standing significant biopsy-proven sarcoidosis pulmonary and skin.\par No active symptoms of chest pain pleuritic chest pain fevers chills or night sweats purulent sputum. Denies hemoptysis. No increased shortness of breath. No cough or wheeze noted. No recent U. upper respiratory infections.\par \par Status post CAT scan the chest May 12, 2017 with no interval change compared to prior scan of April 6, 2016 able to demonstrate right upper lobe consolidation with no significant change left apical consolidation with no significant change. Innumerable pleural and parenchymal nodularity and nodules but again no change in size or number reported.\par Mediastinum demonstrates prominent mediastinal hilar axillary lymphadenopathy as well as subcarinal adenopathy or reported unchanged. There was some component of partial calcification of the mediastinal and hilar lymph node\par \par ADDENDUM\par 2/20\par Ophthalmology /2/2020 - pt states completed thi s year Jan\par No reported uveitis due to sarcoidosis\par

## 2022-06-30 NOTE — REVIEW OF SYSTEMS
[As Noted in HPI] : as noted in HPI [Negative] : Pulmonary Hypertension [de-identified] : left upper vshoulder sarcoid skin lesion bx proven

## 2022-07-01 ENCOUNTER — NON-APPOINTMENT (OUTPATIENT)
Age: 62
End: 2022-07-01

## 2022-07-01 LAB
ALBUMIN SERPL ELPH-MCNC: 4.7 G/DL
ALP BLD-CCNC: 118 U/L
ALT SERPL-CCNC: 36 U/L
ANION GAP SERPL CALC-SCNC: 13 MMOL/L
AST SERPL-CCNC: 29 U/L
BASOPHILS # BLD AUTO: 0.04 K/UL
BASOPHILS NFR BLD AUTO: 0.8 %
BILIRUB SERPL-MCNC: 0.4 MG/DL
BUN SERPL-MCNC: 9 MG/DL
CALCIUM SERPL-MCNC: 9.6 MG/DL
CHLORIDE SERPL-SCNC: 103 MMOL/L
CO2 SERPL-SCNC: 23 MMOL/L
CREAT SERPL-MCNC: 1.08 MG/DL
EGFR: 78 ML/MIN/1.73M2
EOSINOPHIL # BLD AUTO: 0.28 K/UL
EOSINOPHIL NFR BLD AUTO: 5.4 %
GLUCOSE SERPL-MCNC: 150 MG/DL
HCT VFR BLD CALC: 43.6 %
HGB BLD-MCNC: 13.8 G/DL
IMM GRANULOCYTES NFR BLD AUTO: 0.2 %
LYMPHOCYTES # BLD AUTO: 1.21 K/UL
LYMPHOCYTES NFR BLD AUTO: 23.4 %
MAN DIFF?: NORMAL
MCHC RBC-ENTMCNC: 24.6 PG
MCHC RBC-ENTMCNC: 31.7 GM/DL
MCV RBC AUTO: 77.7 FL
MONOCYTES # BLD AUTO: 0.7 K/UL
MONOCYTES NFR BLD AUTO: 13.5 %
NEUTROPHILS # BLD AUTO: 2.93 K/UL
NEUTROPHILS NFR BLD AUTO: 56.7 %
PLATELET # BLD AUTO: 179 K/UL
POTASSIUM SERPL-SCNC: 4.8 MMOL/L
PROT SERPL-MCNC: 7.9 G/DL
RBC # BLD: 5.61 M/UL
RBC # FLD: 13.9 %
SODIUM SERPL-SCNC: 139 MMOL/L
WBC # FLD AUTO: 5.17 K/UL

## 2022-07-04 LAB — ACE BLD-CCNC: 143 U/L

## 2022-10-07 ENCOUNTER — APPOINTMENT (OUTPATIENT)
Dept: PULMONOLOGY | Facility: CLINIC | Age: 62
End: 2022-10-07

## 2022-10-07 VITALS
BODY MASS INDEX: 25.18 KG/M2 | HEART RATE: 85 BPM | SYSTOLIC BLOOD PRESSURE: 123 MMHG | WEIGHT: 170 LBS | RESPIRATION RATE: 14 BRPM | HEIGHT: 69 IN | OXYGEN SATURATION: 97 % | DIASTOLIC BLOOD PRESSURE: 80 MMHG

## 2022-10-07 PROCEDURE — 94010 BREATHING CAPACITY TEST: CPT

## 2022-10-07 PROCEDURE — 94729 DIFFUSING CAPACITY: CPT

## 2022-10-07 PROCEDURE — 94727 GAS DIL/WSHOT DETER LNG VOL: CPT

## 2022-10-07 PROCEDURE — ZZZZZ: CPT

## 2022-10-07 PROCEDURE — 99214 OFFICE O/P EST MOD 30 MIN: CPT | Mod: 25

## 2022-10-07 NOTE — PROCEDURE
[FreeTextEntry1] : PFT 10/7/22\par mild restrictive\par DLCO 50 %  with moderate loss fx  alveolar capillary units\par HGB 13.4\par \par PFT June 30, 2022\par Flow rates normal\par Lung volumes demonstrate a mild reduction at TLC 76% predicted\par Diffusion 50% predicted with a moderate loss of functioning alveolar capillary units\par Hemoglobin 13.4\par Overall interval improvement of pulmonary physiology\par NIOX 25 upper limits of normal\par \par Chest x-ray PA lateral June 30, 2022\par Normal cardiac size\par Right upper lobe conglomerate consolidation greater than left lung with overall abnormalities consistent with known sarcoidosis no interval change dating back to chest x-ray July 12, 2021 by definition minimum late stage III sarcoid disease\par \par PFT 2/10/22\par Mild reduction flow rates \par TLC 67 %\par DLCO 43 % \par IMP\par Mild restrictive  ventilatory impairment with severe gas exchange impairment \par HGB 16.8\par NIOC 28 minimal bronchial inflammation 2/20/22\par \par PFT 11/8/2021\par minimal reduction flow rates\par  TLC  74 %\par  DLCO 48 %\par HGB14.3\par \par PFT 8/9/21\par Normal  flow Rates\par  TLC 64 % mild moderate redeuction\par  DLCO 48 % with severe gas exchange impairment\par HGB 15.0\par NIOX 32 ppb mild bronchial inflammation 8/9/21\par \par PFT 4/5/21\par Mild restrictive  ventilatory impairment\par severe reduction DLCO  with loss fx  alveolar  capillary units\par HGB 14.6\par NIOX   28  ppb minimal elevation 4/5/21\par \par Chest x-ray PA lateral\par July 12, 2021\par Normal cardiac size\par Extensive bilateral parenchymal opacities with right upper lobe component of consolidation\par Consistent with known diagnosis of sarcoidosis\par No interval change compared to chest x-ray July 14, 2020\par \par Chest x-ray PA lateral July 14, 2020\par Normal cardiac size extensive bilateral opacities with more consolidation right upper lateral lung zone\par Compared to a chest x-ray of May 29, 2019 there is no gross interval change identified\par Impression consistent with known diagnosis of stage IV pulmonary sarcoidosis\par \par PFT February 18, 2020\par Very minimal reduction in flow rates\par No bronchodilator response\par Total capacity 77% of predicted cannot exclude a very mild restrictive component\par Severe reduction diffusion 49% predicted with a loss of functioning alveolocapillary units\par \par PFT  body box August 27, 2019\par Spirometry normal\par No response to bronchodilator at FEV1\par Lung volumes normal with total lung capacity 81% predicted.\par Resistance and specific conductance normal.\par Moderate reduction diffusion 54% predicted with a loss of functioning alveolocapillary units\par No decline in pulmonary physiology\par \par Chest x-ray PA lateral projection May 29, 2019\par Cardiac size normal\par Findings consistent with multiple bilateral nodularity interstitial lung disease consistent with known diagnosis of sarcoidosis\par Impression stage III–4 radiographic features of sarcoidosis\par \par Blood Draw\par CBC comprehensive metabolic profile ACE level\par Data review August 28, 2019\par CBC White blood count 4.86 hemoglobin 14.7 hematocrit 47.1\par Platelet count 183,000\par Serum potassium 5.4\par BUN 9 creatinine 0.92\par Liver function testing normal\par Total protein normal 8.2 ACE level pending\par  ACE elevated 156 but decline from prior 171\par \par Echocardiogram completed February 18, 2020\par Unremarkable study with no reported pulmonary hypertension

## 2022-10-07 NOTE — DISCUSSION/SUMMARY
[FreeTextEntry1] : \par Sarcoidosis stage  4  radiographically\par ABNL PFT with severe Diffusion abnormality\par Chest XRAY yearly- June 2023\par  3 mos f/u with transflow\par March 2021 ECHO  Dr. Frank  negative for pulmonary hypertension or valvular heart disease\par declined  flu  vaccine in the past\par Advised risk  benefit  re COVID  Vaccine\par OPTH up to date Dr Alan Jan 2021\par COVID Vaccine  recommended MODERNA  protocol completed Booster \par declined flu  vaccine

## 2022-10-07 NOTE — REVIEW OF SYSTEMS
[As Noted in HPI] : as noted in HPI [Negative] : Pulmonary Hypertension [de-identified] : left upper vshoulder sarcoid skin lesion bx proven

## 2022-10-07 NOTE — HISTORY OF PRESENT ILLNESS
[Stable] : are stable [Difficulty Breathing During Exertion] : denies dyspnea on exertion [Feelings Of Weakness On Exertion] : denies exercise intolerance [Cough] : denies coughing [Wheezing] : denies wheezing [Regional Soft Tissue Swelling Both Lower Extremities] : denies lower extremity edema [Chest Pain Or Discomfort] : denies chest pain [Fever] : denies fever [Wt Gain ___ Lbs] : no recent weight gain [Wt Loss ___ Lbs] : no recent weight loss [Oxygen] : the patient uses no supplemental oxygen [Date: ___] : was performed [unfilled] [de-identified] : extensive finding consistent with sarcoidosis [FreeTextEntry1] : COVID vaccinated \par \par Patient with long-standing significant biopsy-proven sarcoidosis pulmonary and skin.\par No active symptoms of chest pain pleuritic chest pain fevers chills or night sweats purulent sputum. Denies hemoptysis. No increased shortness of breath. No cough or wheeze noted. No recent U. upper respiratory infections.\par \par Status post CAT scan the chest May 12, 2017 with no interval change compared to prior scan of April 6, 2016 able to demonstrate right upper lobe consolidation with no significant change left apical consolidation with no significant change. Innumerable pleural and parenchymal nodularity and nodules but again no change in size or number reported.\par Mediastinum demonstrates prominent mediastinal hilar axillary lymphadenopathy as well as subcarinal adenopathy or reported unchanged. There was some component of partial calcification of the mediastinal and hilar lymph node\par \par ADDENDUM\par 2/20\par Ophthalmology /2/2020 - pt states completed thi s year Jan\par No reported uveitis due to sarcoidosis\par

## 2022-10-10 ENCOUNTER — NON-APPOINTMENT (OUTPATIENT)
Age: 62
End: 2022-10-10

## 2023-01-13 ENCOUNTER — APPOINTMENT (OUTPATIENT)
Dept: PULMONOLOGY | Facility: CLINIC | Age: 63
End: 2023-01-13
Payer: COMMERCIAL

## 2023-01-13 VITALS — HEART RATE: 86 BPM | DIASTOLIC BLOOD PRESSURE: 78 MMHG | OXYGEN SATURATION: 97 % | SYSTOLIC BLOOD PRESSURE: 123 MMHG

## 2023-01-13 LAB — POCT - HEMOGLOBIN (HGB), QUANTITATIVE, TRANSCUTANEOUS: 15

## 2023-01-13 PROCEDURE — 94010 BREATHING CAPACITY TEST: CPT

## 2023-01-13 PROCEDURE — 94618 PULMONARY STRESS TESTING: CPT

## 2023-01-13 PROCEDURE — 95012 NITRIC OXIDE EXP GAS DETER: CPT

## 2023-01-13 PROCEDURE — 94729 DIFFUSING CAPACITY: CPT

## 2023-01-13 PROCEDURE — 88738 HGB QUANT TRANSCUTANEOUS: CPT

## 2023-01-13 PROCEDURE — 99214 OFFICE O/P EST MOD 30 MIN: CPT | Mod: 25

## 2023-01-13 PROCEDURE — ZZZZZ: CPT

## 2023-01-13 PROCEDURE — 71046 X-RAY EXAM CHEST 2 VIEWS: CPT

## 2023-01-13 PROCEDURE — 94727 GAS DIL/WSHOT DETER LNG VOL: CPT

## 2023-01-13 NOTE — PROCEDURE
[FreeTextEntry1] : PFT 1/13/23\par mild restrictive  ventilatory impairment\par TLC 68 % pred\par  DLCO  45 % with severe loss fx  alveolar capillary units\par HGB 15.0\par \par Pulmonary 6-minute walk exercise study January 13, 2023\par Baseline O2 saturation extraction x-ray positive isaias desaturation to 90%\par This does not qualify patient for portable oxygen therapy\par \par PFT 10/7/22\par mild restrictive\par DLCO 50 %  with moderate loss fx  alveolar capillary units\par HGB 13.4\par \par PFT June 30, 2022\par Flow rates normal\par Lung volumes demonstrate a mild reduction at TLC 76% predicted\par Diffusion 50% predicted with a moderate loss of functioning alveolar capillary units\par Hemoglobin 13.4\par Overall interval improvement of pulmonary physiology\par NIOX 25 upper limits of normal\par \par Chest x-ray PA lateral January 13, 2023\par Normal cardiac size\par Right upper lobe conglomerate consolidation greater than left lung with overall abnormalities consistent with known sarcoidosis no interval change dating back to chest x-ray July 12, 2021 by definition minimum late stage III-IV sarcoid disease\par \par PFT 2/10/22\par Mild reduction flow rates \par TLC 67 %\par DLCO 43 % \par IMP\par Mild restrictive  ventilatory impairment with severe gas exchange impairment \par HGB 16.8\par NIOC 28 minimal bronchial inflammation 2/20/22\par \par PFT 11/8/2021\par minimal reduction flow rates\par  TLC  74 %\par  DLCO 48 %\par HGB14.3\par \par PFT 8/9/21\par Normal  flow Rates\par  TLC 64 % mild moderate redeuction\par  DLCO 48 % with severe gas exchange impairment\par HGB 15.0\par NIOX 32 ppb mild bronchial inflammation 8/9/21\par \par PFT 4/5/21\par Mild restrictive  ventilatory impairment\par severe reduction DLCO  with loss fx  alveolar  capillary units\par HGB 14.6\par NIOX   28  ppb minimal elevation 4/5/21\par \par Chest x-ray PA lateral\par July 12, 2021\par Normal cardiac size\par Extensive bilateral parenchymal opacities with right upper lobe component of consolidation\par Consistent with known diagnosis of sarcoidosis\par No interval change compared to chest x-ray July 14, 2020\par \par Chest x-ray PA lateral July 14, 2020\par Normal cardiac size extensive bilateral opacities with more consolidation right upper lateral lung zone\par Compared to a chest x-ray of May 29, 2019 there is no gross interval change identified\par Impression consistent with known diagnosis of stage IV pulmonary sarcoidosis\par \par PFT February 18, 2020\par Very minimal reduction in flow rates\par No bronchodilator response\par Total capacity 77% of predicted cannot exclude a very mild restrictive component\par Severe reduction diffusion 49% predicted with a loss of functioning alveolocapillary units\par \par PFT  body box August 27, 2019\par Spirometry normal\par No response to bronchodilator at FEV1\par Lung volumes normal with total lung capacity 81% predicted.\par Resistance and specific conductance normal.\par Moderate reduction diffusion 54% predicted with a loss of functioning alveolocapillary units\par No decline in pulmonary physiology\par \par Chest x-ray PA lateral projection May 29, 2019\par Cardiac size normal\par Findings consistent with multiple bilateral nodularity interstitial lung disease consistent with known diagnosis of sarcoidosis\par Impression stage III–4 radiographic features of sarcoidosis\par \par Blood Draw\par CBC comprehensive metabolic profile ACE level\par Data review August 28, 2019\par CBC White blood count 4.86 hemoglobin 14.7 hematocrit 47.1\par Platelet count 183,000\par Serum potassium 5.4\par BUN 9 creatinine 0.92\par Liver function testing normal\par Total protein normal 8.2 ACE level pending\par  ACE elevated 156 but decline from prior 171\par \par Echocardiogram completed February 18, 2020\par Unremarkable study with no reported pulmonary hypertension

## 2023-01-13 NOTE — REVIEW OF SYSTEMS
[As Noted in HPI] : as noted in HPI [Negative] : Pulmonary Hypertension [de-identified] : left upper vshoulder sarcoid skin lesion bx proven

## 2023-01-13 NOTE — HISTORY OF PRESENT ILLNESS
[Stable] : are stable [Difficulty Breathing During Exertion] : denies dyspnea on exertion [Feelings Of Weakness On Exertion] : denies exercise intolerance [Cough] : denies coughing [Wheezing] : denies wheezing [Regional Soft Tissue Swelling Both Lower Extremities] : denies lower extremity edema [Chest Pain Or Discomfort] : denies chest pain [Fever] : denies fever [Date: ___] : was performed [unfilled] [Wt Gain ___ Lbs] : no recent weight gain [Wt Loss ___ Lbs] : no recent weight loss [Oxygen] : the patient uses no supplemental oxygen [de-identified] : extensive finding consistent with sarcoidosis [FreeTextEntry1] : COVID vaccinated \par \par Patient with long-standing significant biopsy-proven sarcoidosis pulmonary and skin.\par No active symptoms of chest pain pleuritic chest pain fevers chills or night sweats purulent sputum. Denies hemoptysis. No increased shortness of breath. No cough or wheeze noted. No recent U. upper respiratory infections.\par \par Status post CAT scan the chest May 12, 2017 with no interval change compared to prior scan of April 6, 2016 able to demonstrate right upper lobe consolidation with no significant change left apical consolidation with no significant change. Innumerable pleural and parenchymal nodularity and nodules but again no change in size or number reported.\par Mediastinum demonstrates prominent mediastinal hilar axillary lymphadenopathy as well as subcarinal adenopathy or reported unchanged. There was some component of partial calcification of the mediastinal and hilar lymph node\par \par ADDENDUM\par 2/20\par Ophthalmology /2/2020 - pt states completed thi s year Jan\par No reported uveitis due to sarcoidosis\par

## 2023-01-13 NOTE — DISCUSSION/SUMMARY
[FreeTextEntry1] : Mild bronchial inflammation elevated NIOX 29 PPD continue to trend\par Sarcoidosis stage  4  radiographically\par ABNL PFT with severe Diffusion abnormality\par Chest XRAY - June 2023\par  3 mos f/u with transflow\par March 2021 ECHO  Dr. Frank  negative for pulmonary hypertension or valvular heart disease\par declined  flu  vaccine in the past\par Advised risk  benefit  re COVID  Vaccine\par OPTH up to date Dr Alan Jan 2021\par COVID Vaccine  recommended MODERNA  protocol completed Booster \par declined flu  vaccine\par declined pneumonia  vaccine\par Continue discussed in detail with patient treatment protocols including steroid treatment protocol Plaquenil\par He feels he is functioning well and would like to hold off in the near unless there is any further decline in his symptomatology

## 2023-05-12 ENCOUNTER — APPOINTMENT (OUTPATIENT)
Dept: PULMONOLOGY | Facility: CLINIC | Age: 63
End: 2023-05-12
Payer: COMMERCIAL

## 2023-05-12 ENCOUNTER — RESULT CHARGE (OUTPATIENT)
Age: 63
End: 2023-05-12

## 2023-05-12 VITALS
SYSTOLIC BLOOD PRESSURE: 124 MMHG | HEART RATE: 84 BPM | DIASTOLIC BLOOD PRESSURE: 81 MMHG | OXYGEN SATURATION: 97 % | RESPIRATION RATE: 16 BRPM

## 2023-05-12 LAB
GLUCOSE BLDC GLUCOMTR-MCNC: 105
HBA1C MFR BLD HPLC: 10.9
POCT - HEMOGLOBIN (HGB), QUANTITATIVE, TRANSCUTANEOUS: 14.6

## 2023-05-12 PROCEDURE — 88738 HGB QUANT TRANSCUTANEOUS: CPT

## 2023-05-12 PROCEDURE — 94729 DIFFUSING CAPACITY: CPT

## 2023-05-12 PROCEDURE — 94010 BREATHING CAPACITY TEST: CPT

## 2023-05-12 PROCEDURE — 94727 GAS DIL/WSHOT DETER LNG VOL: CPT

## 2023-05-12 PROCEDURE — ZZZZZ: CPT

## 2023-05-12 PROCEDURE — 71046 X-RAY EXAM CHEST 2 VIEWS: CPT

## 2023-05-12 PROCEDURE — 99214 OFFICE O/P EST MOD 30 MIN: CPT | Mod: 25

## 2023-05-12 PROCEDURE — 82962 GLUCOSE BLOOD TEST: CPT

## 2023-05-12 NOTE — HISTORY OF PRESENT ILLNESS
[Stable] : are stable [Difficulty Breathing During Exertion] : denies dyspnea on exertion [Feelings Of Weakness On Exertion] : denies exercise intolerance [Cough] : denies coughing [Wheezing] : denies wheezing [Regional Soft Tissue Swelling Both Lower Extremities] : denies lower extremity edema [Chest Pain Or Discomfort] : denies chest pain [Fever] : denies fever [Date: ___] : was performed [unfilled] [Wt Gain ___ Lbs] : no recent weight gain [Wt Loss ___ Lbs] : no recent weight loss [Oxygen] : the patient uses no supplemental oxygen [de-identified] : extensive finding consistent with sarcoidosis [FreeTextEntry1] : COVID vaccinated \par \par new dx DM  not on medication with  management PMD\par \par pending ORTHO R ankle\par \par Patient with long-standing significant biopsy-proven sarcoidosis pulmonary and skin.\par No active symptoms of chest pain pleuritic chest pain fevers chills or night sweats purulent sputum. Denies hemoptysis. No increased shortness of breath. No cough or wheeze noted. No recent U. upper respiratory infections.\par \par Status post CAT scan the chest May 12, 2017 with no interval change compared to prior scan of April 6, 2016 able to demonstrate right upper lobe consolidation with no significant change left apical consolidation with no significant change. Innumerable pleural and parenchymal nodularity and nodules but again no change in size or number reported.\par Mediastinum demonstrates prominent mediastinal hilar axillary lymphadenopathy as well as subcarinal adenopathy or reported unchanged. There was some component of partial calcification of the mediastinal and hilar lymph node\par \par ADDENDUM\par 2/20\par Ophthalmology /2/2020 - pt states completed thi s year Jan\par No reported uveitis due to sarcoidosis\par

## 2023-05-12 NOTE — DISCUSSION/SUMMARY
[FreeTextEntry1] : Mild bronchial inflammation elevated NIOX 29 PPD continue to trend\par Sarcoidosis stage  4  radiographically\par ABNL PFT with severe Diffusion abnormality\par Hemoglobin A1c at patient request 10.9 consistent with poorly controlled diabetes under care of primary care physician but patient states interval improvement\par Chest XRAY - June 2024\par  3 mos f/u with transflow\par March 2021 ECHO  Dr. Frank  negative for pulmonary hypertension or valvular heart disease\par declined  flu  vaccine in the past\par Advised risk  benefit  re COVID  Vaccine\par OPTH up to date Dr Alan Jan 2021\par COVID Vaccine  recommended MODERNA  protocol completed Booster \par declined flu  vaccine\par declined pneumonia  vaccine\par Continue discussed in detail with patient treatment protocols including steroid treatment protocol Plaquenil\par He feels he is functioning well and would like to hold off in the near unless there is any further decline in his symptomatology

## 2023-05-12 NOTE — REVIEW OF SYSTEMS
[As Noted in HPI] : as noted in HPI [Negative] : Pulmonary Hypertension [de-identified] : left upper vshoulder sarcoid skin lesion bx proven

## 2023-05-12 NOTE — PROCEDURE
[FreeTextEntry1] : POCT May 12, 2023\par Random glucose 95\par  hemoglobin A1c 10.9% consistent with diabetes\par \par PFT May 12, 2023 normal reduction flow rate\par PFT 70% predicted\par Diffusion 53% predicted\par Hemoglobin 14.6\par Mild restrictive ventilatory impairment with moderate borderline severe gas exchange impairment\par Interval improvement of the TLC and diffusion\par \par Chest x-ray PA lateral May 12, 2023\par Cardiac size is normal\par Extensive stage III sarcoidosis bilateral parenchymal densities opacities no interval change\par \par PFT 1/13/23\par mild restrictive  ventilatory impairment\par TLC 68 % pred\par  DLCO  45 % with severe loss fx  alveolar capillary units\par HGB 15.0\par \par Pulmonary 6-minute walk exercise study January 13, 2023\par Baseline O2 saturation extraction x-ray positive isiaas desaturation to 90%\par This does not qualify patient for portable oxygen therapy\par \par PFT 10/7/22\par mild restrictive\par DLCO 50 %  with moderate loss fx  alveolar capillary units\par HGB 13.4\par \par PFT June 30, 2022\par Flow rates normal\par Lung volumes demonstrate a mild reduction at TLC 76% predicted\par Diffusion 50% predicted with a moderate loss of functioning alveolar capillary units\par Hemoglobin 13.4\par Overall interval improvement of pulmonary physiology\par NIOX 25 upper limits of normal\par \par Chest x-ray PA lateral January 13, 2023\par Normal cardiac size\par Right upper lobe conglomerate consolidation greater than left lung with overall abnormalities consistent with known sarcoidosis no interval change dating back to chest x-ray July 12, 2021 by definition minimum late stage III-IV sarcoid disease\par \par PFT 2/10/22\par Mild reduction flow rates \par TLC 67 %\par DLCO 43 % \par IMP\par Mild restrictive  ventilatory impairment with severe gas exchange impairment \par HGB 16.8\par NIOC 28 minimal bronchial inflammation 2/20/22\par \par PFT 11/8/2021\par minimal reduction flow rates\par  TLC  74 %\par  DLCO 48 %\par HGB14.3\par \par PFT 8/9/21\par Normal  flow Rates\par  TLC 64 % mild moderate redeuction\par  DLCO 48 % with severe gas exchange impairment\par HGB 15.0\par NIOX 32 ppb mild bronchial inflammation 8/9/21\par \par PFT 4/5/21\par Mild restrictive  ventilatory impairment\par severe reduction DLCO  with loss fx  alveolar  capillary units\par HGB 14.6\par NIOX   28  ppb minimal elevation 4/5/21\par \par Chest x-ray PA lateral\par July 12, 2021\par Normal cardiac size\par Extensive bilateral parenchymal opacities with right upper lobe component of consolidation\par Consistent with known diagnosis of sarcoidosis\par No interval change compared to chest x-ray July 14, 2020\par \par Chest x-ray PA lateral July 14, 2020\par Normal cardiac size extensive bilateral opacities with more consolidation right upper lateral lung zone\par Compared to a chest x-ray of May 29, 2019 there is no gross interval change identified\par Impression consistent with known diagnosis of stage IV pulmonary sarcoidosis\par \par PFT February 18, 2020\par Very minimal reduction in flow rates\par No bronchodilator response\par Total capacity 77% of predicted cannot exclude a very mild restrictive component\par Severe reduction diffusion 49% predicted with a loss of functioning alveolocapillary units\par \par PFT  body box August 27, 2019\par Spirometry normal\par No response to bronchodilator at FEV1\par Lung volumes normal with total lung capacity 81% predicted.\par Resistance and specific conductance normal.\par Moderate reduction diffusion 54% predicted with a loss of functioning alveolocapillary units\par No decline in pulmonary physiology\par \par Chest x-ray PA lateral projection May 29, 2019\par Cardiac size normal\par Findings consistent with multiple bilateral nodularity interstitial lung disease consistent with known diagnosis of sarcoidosis\par Impression stage III–4 radiographic features of sarcoidosis\par \par Blood Draw\par CBC comprehensive metabolic profile ACE level\par Data review August 28, 2019\par CBC White blood count 4.86 hemoglobin 14.7 hematocrit 47.1\par Platelet count 183,000\par Serum potassium 5.4\par BUN 9 creatinine 0.92\par Liver function testing normal\par Total protein normal 8.2 ACE level pending\par  ACE elevated 156 but decline from prior 171\par \par Echocardiogram completed February 18, 2020\par Unremarkable study with no reported pulmonary hypertension

## 2023-06-25 NOTE — PROCEDURE
show [FreeTextEntry1] : PFT 4/5/21\par Mild restrictive  ventilatory impairment\par severe reduction DLCO  with loss fx  alveolar  capillary units\par HGB 14.6\par NIOX   28  ppb minimal elevation 4/5/21\par \par Chest x-ray PA lateral\par July 12, 2021\par Normal cardiac size\par Extensive bilateral parenchymal opacities with right upper lobe component of consolidation\par Consistent with known diagnosis of sarcoidosis\par No interval change compared to chest x-ray July 14, 2020\par \par Chest x-ray PA lateral July 14, 2020\par Normal cardiac size extensive bilateral opacities with more consolidation right upper lateral lung zone\par Compared to a chest x-ray of May 29, 2019 there is no gross interval change identified\par Impression consistent with known diagnosis of stage IV pulmonary sarcoidosis\par \par PFT February 18, 2020\par Very minimal reduction in flow rates\par No bronchodilator response\par Total capacity 77% of predicted cannot exclude a very mild restrictive component\par Severe reduction diffusion 49% predicted with a loss of functioning alveolocapillary units\par \par PFT  body box August 27, 2019\par Spirometry normal\par No response to bronchodilator at FEV1\par Lung volumes normal with total lung capacity 81% predicted.\par Resistance and specific conductance normal.\par Moderate reduction diffusion 54% predicted with a loss of functioning alveolocapillary units\par No decline in pulmonary physiology\par \par Chest x-ray PA lateral projection May 29, 2019\par Cardiac size normal\par Findings consistent with multiple bilateral nodularity interstitial lung disease consistent with known diagnosis of sarcoidosis\par Impression stage III–4 radiographic features of sarcoidosis\par \par Blood Draw\par CBC comprehensive metabolic profile ACE level\par Data review August 28, 2019\par CBC White blood count 4.86 hemoglobin 14.7 hematocrit 47.1\par Platelet count 183,000\par Serum potassium 5.4\par BUN 9 creatinine 0.92\par Liver function testing normal\par Total protein normal 8.2 ACE level pending\par  ACE elevated 156 but decline from prior 171\par \par Echocardiogram completed February 18, 2020\par Unremarkable study with no reported pulmonary hypertension

## 2023-08-10 ENCOUNTER — APPOINTMENT (OUTPATIENT)
Dept: PULMONOLOGY | Facility: CLINIC | Age: 63
End: 2023-08-10
Payer: COMMERCIAL

## 2023-08-10 VITALS
DIASTOLIC BLOOD PRESSURE: 70 MMHG | HEIGHT: 69 IN | OXYGEN SATURATION: 97 % | WEIGHT: 160 LBS | SYSTOLIC BLOOD PRESSURE: 104 MMHG | HEART RATE: 72 BPM | BODY MASS INDEX: 23.7 KG/M2

## 2023-08-10 DIAGNOSIS — E11.9 TYPE 2 DIABETES MELLITUS W/OUT COMPLICATIONS: ICD-10-CM

## 2023-08-10 PROCEDURE — 94010 BREATHING CAPACITY TEST: CPT

## 2023-08-10 PROCEDURE — 94729 DIFFUSING CAPACITY: CPT

## 2023-08-10 PROCEDURE — 99214 OFFICE O/P EST MOD 30 MIN: CPT | Mod: 25

## 2023-08-10 PROCEDURE — 94727 GAS DIL/WSHOT DETER LNG VOL: CPT

## 2023-08-10 PROCEDURE — ZZZZZ: CPT

## 2023-08-10 NOTE — REVIEW OF SYSTEMS
[As Noted in HPI] : as noted in HPI [Negative] : Pulmonary Hypertension [de-identified] : left upper vshoulder sarcoid skin lesion bx proven

## 2023-08-10 NOTE — DISCUSSION/SUMMARY
[FreeTextEntry1] : Mild bronchial inflammation elevated NIOX 29 PPD continue to trend Sarcoidosis stage  4  radiographically ABNL PFT with severe Diffusion abnormality Hemoglobin A1c at patient request 10.9 consistent with poorly controlled diabetes under care of primary care physician but patient states interval improvement Chest XRAY -monitor 6 months November 2024  3 mos f/u with transflow March 2021 ECHO  Dr. Frank  negative for pulmonary hypertension or valvular heart disease declined  flu  vaccine in the past Advised risk  benefit  re COVID  Vaccine OPTH up to date Dr Alan Jan 2021 COVID Vaccine  recommended MODERNA  protocol completed Booster  declined flu  vaccine declined pneumonia  vaccine Continue discussed in detail with patient treatment protocols including steroid treatment protocol Plaquenil He feels he is functioning well and would like to hold off in the near unless there is any further decline in his symptomatology

## 2023-08-10 NOTE — PROCEDURE
[FreeTextEntry1] : PFT August 10, 2023 Mild restrictive ventilatory impairment No air trapping Diffusion 40% predicted with severe functioning alveolar capillary units Hemoglobin 14.6 There is evidence of wax and wane of the total lung capacity Continue to trend   POCT May 12, 2023 Random glucose 95  hemoglobin A1c 10.9% consistent with diabetes  PFT May 12, 2023 normal reduction flow rate PFT 70% predicted Diffusion 53% predicted Hemoglobin 14.6 Mild restrictive ventilatory impairment with moderate borderline severe gas exchange impairment Interval improvement of the TLC and diffusion  Chest x-ray PA lateral May 12, 2023 Cardiac size is normal Extensive stage III sarcoidosis bilateral parenchymal densities opacities no interval change  PFT 1/13/23 mild restrictive  ventilatory impairment TLC 68 % pred  DLCO  45 % with severe loss fx  alveolar capillary units HGB 15.0  Pulmonary 6-minute walk exercise study January 13, 2023 Baseline O2 saturation extraction x-ray positive isaias desaturation to 90% This does not qualify patient for portable oxygen therapy  PFT 10/7/22 mild restrictive DLCO 50 %  with moderate loss fx  alveolar capillary units HGB 13.4  PFT June 30, 2022 Flow rates normal Lung volumes demonstrate a mild reduction at TLC 76% predicted Diffusion 50% predicted with a moderate loss of functioning alveolar capillary units Hemoglobin 13.4 Overall interval improvement of pulmonary physiology NIOX 25 upper limits of normal  Chest x-ray PA lateral January 13, 2023 Normal cardiac size Right upper lobe conglomerate consolidation greater than left lung with overall abnormalities consistent with known sarcoidosis no interval change dating back to chest x-ray July 12, 2021 by definition minimum late stage III-IV sarcoid disease  PFT 2/10/22 Mild reduction flow rates  TLC 67 % DLCO 43 %  IMP Mild restrictive  ventilatory impairment with severe gas exchange impairment  HGB 16.8 NIOC 28 minimal bronchial inflammation 2/20/22  PFT 11/8/2021 minimal reduction flow rates  TLC  74 %  DLCO 48 % HGB14.3  PFT 8/9/21 Normal  flow Rates  TLC 64 % mild moderate redeuction  DLCO 48 % with severe gas exchange impairment HGB 15.0 NIOX 32 ppb mild bronchial inflammation 8/9/21  PFT 4/5/21 Mild restrictive  ventilatory impairment severe reduction DLCO  with loss fx  alveolar  capillary units HGB 14.6 NIOX   28  ppb minimal elevation 4/5/21  Chest x-ray PA lateral July 12, 2021 Normal cardiac size Extensive bilateral parenchymal opacities with right upper lobe component of consolidation Consistent with known diagnosis of sarcoidosis No interval change compared to chest x-ray July 14, 2020  Chest x-ray PA lateral July 14, 2020 Normal cardiac size extensive bilateral opacities with more consolidation right upper lateral lung zone Compared to a chest x-ray of May 29, 2019 there is no gross interval change identified Impression consistent with known diagnosis of stage IV pulmonary sarcoidosis  PFT February 18, 2020 Very minimal reduction in flow rates No bronchodilator response Total capacity 77% of predicted cannot exclude a very mild restrictive component Severe reduction diffusion 49% predicted with a loss of functioning alveolocapillary units  PFT  body box August 27, 2019 Spirometry normal No response to bronchodilator at FEV1 Lung volumes normal with total lung capacity 81% predicted. Resistance and specific conductance normal. Moderate reduction diffusion 54% predicted with a loss of functioning alveolocapillary units No decline in pulmonary physiology  Chest x-ray PA lateral projection May 29, 2019 Cardiac size normal Findings consistent with multiple bilateral nodularity interstitial lung disease consistent with known diagnosis of sarcoidosis Impression stage III-4 radiographic features of sarcoidosis  Blood Draw CBC comprehensive metabolic profile ACE level Data review August 28, 2019 CBC White blood count 4.86 hemoglobin 14.7 hematocrit 47.1 Platelet count 183,000 Serum potassium 5.4 BUN 9 creatinine 0.92 Liver function testing normal Total protein normal 8.2 ACE level pending  ACE elevated 156 but decline from prior 171  Echocardiogram completed February 18, 2020 Unremarkable study with no reported pulmonary hypertension

## 2023-08-10 NOTE — HISTORY OF PRESENT ILLNESS
[Stable] : are stable [Difficulty Breathing During Exertion] : denies dyspnea on exertion [Feelings Of Weakness On Exertion] : denies exercise intolerance [Cough] : denies coughing [Wheezing] : denies wheezing [Regional Soft Tissue Swelling Both Lower Extremities] : denies lower extremity edema [Chest Pain Or Discomfort] : denies chest pain [Fever] : denies fever [Date: ___] : was performed [unfilled] [Wt Gain ___ Lbs] : no recent weight gain [Wt Loss ___ Lbs] : no recent weight loss [Oxygen] : the patient uses no supplemental oxygen [de-identified] : extensive finding consistent with sarcoidosis [FreeTextEntry1] : States up-to-date with management diabetes primary care physician Ramón Heart MD Respiratory status remained stable No comorbidities chest pain chest tightness.  Production hemoptysis No recent fevers chills or sweats  COVID vaccinated   new dx DM  not on medication with  management PMD  pending ORTHO R ankle  Patient with long-standing significant biopsy-proven sarcoidosis pulmonary and skin. No active symptoms of chest pain pleuritic chest pain fevers chills or night sweats purulent sputum. Denies hemoptysis. No increased shortness of breath. No cough or wheeze noted. No recent U. upper respiratory infections.  Status post CAT scan the chest May 12, 2017 with no interval change compared to prior scan of April 6, 2016 able to demonstrate right upper lobe consolidation with no significant change left apical consolidation with no significant change. Innumerable pleural and parenchymal nodularity and nodules but again no change in size or number reported. Mediastinum demonstrates prominent mediastinal hilar axillary lymphadenopathy as well as subcarinal adenopathy or reported unchanged. There was some component of partial calcification of the mediastinal and hilar lymph node  ADDENDUM 2/20 Ophthalmology /2/2020 - pt states completed thi s year Sunil No reported uveitis due to sarcoidosis

## 2023-08-10 NOTE — PHYSICAL EXAM
[General Appearance - Well Developed] : well developed [Normal Appearance] : normal appearance [Well Groomed] : well groomed [General Appearance - Well Nourished] : well nourished [No Deformities] : no deformities [General Appearance - In No Acute Distress] : no acute distress [Normal Conjunctiva] : the conjunctiva exhibited no abnormalities [Eyelids - No Xanthelasma] : the eyelids demonstrated no xanthelasmas [Normal Oropharynx] : normal oropharynx [Neck Appearance] : the appearance of the neck was normal [Neck Cervical Mass (___cm)] : no neck mass was observed [Jugular Venous Distention Increased] : there was no jugular-venous distention [Thyroid Diffuse Enlargement] : the thyroid was not enlarged [Thyroid Nodule] : there were no palpable thyroid nodules [Heart Rate And Rhythm] : heart rate and rhythm were normal [Heart Sounds] : normal S1 and S2 [Murmurs] : no murmurs present [Respiration, Rhythm And Depth] : normal respiratory rhythm and effort [Exaggerated Use Of Accessory Muscles For Inspiration] : no accessory muscle use [Lungs Percussion] : the lungs were normal to percussion [Auscultation Breath Sounds / Voice Sounds] : lungs were clear to auscultation bilaterally [Bowel Sounds] : normal bowel sounds [Abdomen Tenderness] : non-tender [Abdomen Soft] : soft [Abdomen Mass (___ Cm)] : no abdominal mass palpated [Nail Clubbing] : no clubbing of the fingernails [Cyanosis, Localized] : no localized cyanosis [Petechial Hemorrhages (___cm)] : no petechial hemorrhages [] : no ischemic changes [Deep Tendon Reflexes (DTR)] : deep tendon reflexes were 2+ and symmetric [Sensation] : the sensory exam was normal to light touch and pinprick [No Focal Deficits] : no focal deficits [Oriented To Time, Place, And Person] : oriented to person, place, and time [Impaired Insight] : insight and judgment were intact [Affect] : the affect was normal [FreeTextEntry1] : chronic sarcoid rash Left posterior  thorax

## 2023-11-13 ENCOUNTER — APPOINTMENT (OUTPATIENT)
Dept: PULMONOLOGY | Facility: CLINIC | Age: 63
End: 2023-11-13
Payer: COMMERCIAL

## 2023-11-13 VITALS — HEART RATE: 65 BPM | OXYGEN SATURATION: 97 % | DIASTOLIC BLOOD PRESSURE: 70 MMHG | SYSTOLIC BLOOD PRESSURE: 110 MMHG

## 2023-11-13 LAB — POCT - HEMOGLOBIN (HGB), QUANTITATIVE, TRANSCUTANEOUS: 15

## 2023-11-13 PROCEDURE — 94727 GAS DIL/WSHOT DETER LNG VOL: CPT

## 2023-11-13 PROCEDURE — 88738 HGB QUANT TRANSCUTANEOUS: CPT

## 2023-11-13 PROCEDURE — 71046 X-RAY EXAM CHEST 2 VIEWS: CPT

## 2023-11-13 PROCEDURE — 99214 OFFICE O/P EST MOD 30 MIN: CPT | Mod: 25

## 2023-11-13 PROCEDURE — 94010 BREATHING CAPACITY TEST: CPT

## 2023-11-13 PROCEDURE — 94729 DIFFUSING CAPACITY: CPT

## 2023-11-13 PROCEDURE — ZZZZZ: CPT

## 2023-12-11 NOTE — PROCEDURE
Occupational Therapy    Occupational Therapy    Evaluation    Patient Name: Noemí Hinton  MRN: [id number]  Today's Date: 12/11/2023  Time Calculation  Start Time: 1043  Stop Time: 1106  Time Calculation (min): 23 min  Rm: 3117    Assessment  IP OT Assessment  OT Assessment: Pt pleasantly confused, but willing to work with OT. Pt requires increased assist with ADL's and mobility. Recommend SNF to increase safety and independence with ADL's.  Prognosis: Fair  End of Session Communication: Bedside nurse  End of Session Patient Position: Up in chair, Alarm on    Plan:  Treatment Interventions: ADL retraining, Functional transfer training, UE strengthening/ROM, Endurance training, Neuromuscular reeducation  OT Frequency: 3 times per week  OT Discharge Recommendations: Moderate intensity level of continued care (SNF)  Equipment Recommended upon Discharge: Wheeled walker  OT Recommended Transfer Status: Moderate assist, Assist of 2  OT - OK to Discharge: Yes (to next level of care)    Subjective     Current Problem:  1. Vaginal bleeding        2. Gross hematuria        3. Supratherapeutic INR        4. Adverse effect of warfarin        5. Hydronephrosis due to obstruction of bladder        6. Hyperkalemia        7. Acidosis        8. YAA (acute kidney injury) (CMS/[facility])            General:  General  Reason for Referral: ADL's; Safety Assessment  Referred By: Ciro Garcia  Co-Treatment: PT  Prior to Session Communication: Bedside nurse  Patient Position Received: Bed, 3 rail up, Alarm on    Per EMR: presenting with blood in her diaper.  This been ongoing since yesterday.  She is unsure whether or not it is coming from her urine or from her vagina.  This is around the floor.  She does note ongoing urinary frequency, using the restroom every 2 hours with smaller volumes.  She continues to eat and drink appropriately denies fever, chills chest pain, shortness of breath, nausea, vomiting, abdominal pelvic pain, diarrhea.       Precautions:  Medical Precautions: Fall precautions    Vital Signs:  SpO2: 94 %    Pain:  Pain Assessment  Pain Assessment: 0-10  Pain Score: 0 - No pain    Objective     Cognition:  Overall Cognitive Status: Impaired  Orientation Level: Disoriented to place, Disoriented to situation, Disoriented to time     Home Living:  Pt is a questionable historian. Pt appeared to explain living in an assisted vs independent living apt. Pt receives assist in the am and pm with ADL's and mobility.   Per EMR: pt is from [facility] for respit care.     Seems as though pt went to SNF after hip surgery s/p fall in August.  Unsure if pt went back home after SNF stay or transitioned to senior living/ILF.  PT states she uses FWW and requires assistance with bed mobility and ADLs.         ADL:  Eating Assistance: Stand by  Grooming Assistance: Minimal  Bathing Assistance: Moderate  UE Dressing Assistance: Moderate  LE Dressing Assistance: Maximal  Toileting Assistance with Device: Maximal    Activity Tolerance:  Endurance: Tolerates less than 10 min exercise, no significant change in vital signs    Bed Mobility/Transfers:   Bed Mobility  Supine to sit: Mod A x2; pt able to move BLE towards EOB but required assistance getting trunk upright     Transfers  Sit to stand: Mod A x2; Vcs for hand placement   Stand to sit: Min A x2; Vcs for hand placement      Ambulation/Gait Training  Pt ambulated 4 ft from bed to chair with Min A x1-2 and FWW; Vcs for sequencing and guidance     Sitting Balance:  Static Sitting Balance  Static Sitting-Comment/Number of Minutes: fair+  Dynamic Sitting Balance  Dynamic Sitting-Comments: fair    Standing Balance:  Static Standing Balance  Static Standing-Comment/Number of Minutes: fair-  Dynamic Standing Balance  Dynamic Standing-Comments: poor    Sensation:  Light Touch: No apparent deficits    Strength:  Strength Comments: decreased strength B UE's      Extremities: RUE   RUE :  (limited AROM  shoulder flexion (about 90 degrees)) and LUE   LUE:  (limited shoulder AROM flexion (about 45 degrees))    Outcome Measures: [facility] Daily Activity  Putting on and taking off regular lower body clothing: A lot  Bathing (including washing, rinsing, drying): A lot  Putting on and taking off regular upper body clothing: A little  Toileting, which includes using toilet, bedpan or urinal: A lot  Taking care of personal grooming such as brushing teeth: A little  Eating Meals: A little  Daily Activity - Total Score: 15    EDUCATION:  Education  Individual(s) Educated: Patient  Education Provided:  (safety)      Goals:   Encounter Problems       Encounter Problems (Active)       OT Goals       OT Goal 1       Start:  12/11/23    Expected End:  12/25/23       Pt will complete all bed mobility with Min A safely            OT Goal 2       Start:  12/11/23    Expected End:  12/25/23       Pt will complete ADL's and mobility with good sit balance and fair+ stand balance           OT Goal 3       Start:  12/11/23    Expected End:  12/25/23       Pt with complete all transfers safely with CGA           OT Goal 4       Start:  12/11/23    Expected End:  12/25/23       Pt will complete UB dressing ADL's with CGA using adaptive device(s) as needed          OT Goal 5       Start:  12/11/23    Expected End:  12/25/23       Pt with complete grooming ADL's with supervision after setup                  Treatment: Pt required Mod A of 2 to complete supine-sit and to scoot hips to EOB. Pt with fair sit balance on EOB. Pt completed all transfers with Mod A of 2  Pt ambulated a few feet in room with Min A using wheeled walker.  Pt completed transfer to and remained in bedside chair with chair alarm on and call light within reach.       [FreeTextEntry1] : PFT 11/8/2021\par minimal reduction flow rates\par  TLC  74 %\par  DLCO 48 %\par HGB14.3\par \par PFT 8/9/21\par Normal  flow Rates\par  TLC 64 % mild moderate redeuction\par  DLCO 48 % with severe gas exchange impairment\par HGB 15.0\par NIOX 32 ppb mild bronchial inflammation 8/9/21\par \par PFT 4/5/21\par Mild restrictive  ventilatory impairment\par severe reduction DLCO  with loss fx  alveolar  capillary units\par HGB 14.6\par NIOX   28  ppb minimal elevation 4/5/21\par \par Chest x-ray PA lateral\par July 12, 2021\par Normal cardiac size\par Extensive bilateral parenchymal opacities with right upper lobe component of consolidation\par Consistent with known diagnosis of sarcoidosis\par No interval change compared to chest x-ray July 14, 2020\par \par Chest x-ray PA lateral July 14, 2020\par Normal cardiac size extensive bilateral opacities with more consolidation right upper lateral lung zone\par Compared to a chest x-ray of May 29, 2019 there is no gross interval change identified\par Impression consistent with known diagnosis of stage IV pulmonary sarcoidosis\par \par PFT February 18, 2020\par Very minimal reduction in flow rates\par No bronchodilator response\par Total capacity 77% of predicted cannot exclude a very mild restrictive component\par Severe reduction diffusion 49% predicted with a loss of functioning alveolocapillary units\par \par PFT  body box August 27, 2019\par Spirometry normal\par No response to bronchodilator at FEV1\par Lung volumes normal with total lung capacity 81% predicted.\par Resistance and specific conductance normal.\par Moderate reduction diffusion 54% predicted with a loss of functioning alveolocapillary units\par No decline in pulmonary physiology\par \par Chest x-ray PA lateral projection May 29, 2019\par Cardiac size normal\par Findings consistent with multiple bilateral nodularity interstitial lung disease consistent with known diagnosis of sarcoidosis\par Impression stage III–4 radiographic features of sarcoidosis\par \par Blood Draw\par CBC comprehensive metabolic profile ACE level\par Data review August 28, 2019\par CBC White blood count 4.86 hemoglobin 14.7 hematocrit 47.1\par Platelet count 183,000\par Serum potassium 5.4\par BUN 9 creatinine 0.92\par Liver function testing normal\par Total protein normal 8.2 ACE level pending\par  ACE elevated 156 but decline from prior 171\par \par Echocardiogram completed February 18, 2020\par Unremarkable study with no reported pulmonary hypertension

## 2024-01-16 ENCOUNTER — NON-APPOINTMENT (OUTPATIENT)
Age: 64
End: 2024-01-16

## 2024-02-15 ENCOUNTER — APPOINTMENT (OUTPATIENT)
Dept: PULMONOLOGY | Facility: CLINIC | Age: 64
End: 2024-02-15

## 2024-02-15 ENCOUNTER — APPOINTMENT (OUTPATIENT)
Dept: PULMONOLOGY | Facility: CLINIC | Age: 64
End: 2024-02-15
Payer: MEDICARE

## 2024-02-15 VITALS — DIASTOLIC BLOOD PRESSURE: 77 MMHG | HEART RATE: 69 BPM | SYSTOLIC BLOOD PRESSURE: 120 MMHG | OXYGEN SATURATION: 99 %

## 2024-02-15 PROCEDURE — 94729 DIFFUSING CAPACITY: CPT

## 2024-02-15 PROCEDURE — 99214 OFFICE O/P EST MOD 30 MIN: CPT | Mod: 25

## 2024-02-15 PROCEDURE — 95012 NITRIC OXIDE EXP GAS DETER: CPT

## 2024-02-15 PROCEDURE — 94727 GAS DIL/WSHOT DETER LNG VOL: CPT

## 2024-02-15 PROCEDURE — 94010 BREATHING CAPACITY TEST: CPT

## 2024-02-15 NOTE — PROCEDURE
[FreeTextEntry1] : Preprocedure PFT February 15, 2024 Mild restrictive ventilatory impairment Severe reduction diffusion at 49% in addition with gas exchange impairment NIOX 38 consistent with inflammatory airway disease and patient with known sarcoidosis February 15, 2024   PFT November 13, 2023 normal reduction of flow rates FEV1 FVC ratio is normal TLC 73% predicted Diffusion 47% predicted Hemoglobin 15.0 Mild restrictive ventilatory impairment with severe gas exchange impairment Clinical correlation patient with extensive radiographic sarcoidosis Overall data comparison demonstrates stable pulmonary physiology  Chest x-ray PA and lateral November 13, 2023 Extensive bilateral opacities with more consolidation at the right upper lung zone with radiographic progression compared to prior chest x-ray of May 12, 2023   PFT August 10, 2023 Mild restrictive ventilatory impairment No air trapping Diffusion 40% predicted with severe functioning alveolar capillary units Hemoglobin 14.6 There is evidence of wax and wane of the total lung capacity Continue to trend   POCT May 12, 2023 Random glucose 95  hemoglobin A1c 10.9% consistent with diabetes  PFT May 12, 2023 normal reduction flow rate PFT 70% predicted Diffusion 53% predicted Hemoglobin 14.6 Mild restrictive ventilatory impairment with moderate borderline severe gas exchange impairment Interval improvement of the TLC and diffusion  Chest x-ray PA lateral May 12, 2023 Cardiac size is normal Extensive stage III sarcoidosis bilateral parenchymal densities opacities no interval change  PFT 1/13/23 mild restrictive  ventilatory impairment TLC 68 % pred  DLCO  45 % with severe loss fx  alveolar capillary units HGB 15.0  Pulmonary 6-minute walk exercise study January 13, 2023 Baseline O2 saturation extraction x-ray positive isaias desaturation to 90% This does not qualify patient for portable oxygen therapy  PFT 10/7/22 mild restrictive DLCO 50 %  with moderate loss fx  alveolar capillary units HGB 13.4  PFT June 30, 2022 Flow rates normal Lung volumes demonstrate a mild reduction at TLC 76% predicted Diffusion 50% predicted with a moderate loss of functioning alveolar capillary units Hemoglobin 13.4 Overall interval improvement of pulmonary physiology NIOX 25 upper limits of normal  Chest x-ray PA lateral January 13, 2023 Normal cardiac size Right upper lobe conglomerate consolidation greater than left lung with overall abnormalities consistent with known sarcoidosis no interval change dating back to chest x-ray July 12, 2021 by definition minimum late stage III-IV sarcoid disease  PFT 2/10/22 Mild reduction flow rates  TLC 67 % DLCO 43 %  IMP Mild restrictive  ventilatory impairment with severe gas exchange impairment  HGB 16.8 NIOC 28 minimal bronchial inflammation 2/20/22  PFT 11/8/2021 minimal reduction flow rates  TLC  74 %  DLCO 48 % HGB14.3  PFT 8/9/21 Normal  flow Rates  TLC 64 % mild moderate redeuction  DLCO 48 % with severe gas exchange impairment HGB 15.0 NIOX 32 ppb mild bronchial inflammation 8/9/21  PFT 4/5/21 Mild restrictive  ventilatory impairment severe reduction DLCO  with loss fx  alveolar  capillary units HGB 14.6 NIOX   28  ppb minimal elevation 4/5/21  Chest x-ray PA lateral July 12, 2021 Normal cardiac size Extensive bilateral parenchymal opacities with right upper lobe component of consolidation Consistent with known diagnosis of sarcoidosis No interval change compared to chest x-ray July 14, 2020  Chest x-ray PA lateral July 14, 2020 Normal cardiac size extensive bilateral opacities with more consolidation right upper lateral lung zone Compared to a chest x-ray of May 29, 2019 there is no gross interval change identified Impression consistent with known diagnosis of stage IV pulmonary sarcoidosis  PFT February 18, 2020 Very minimal reduction in flow rates No bronchodilator response Total capacity 77% of predicted cannot exclude a very mild restrictive component Severe reduction diffusion 49% predicted with a loss of functioning alveolocapillary units  PFT  body box August 27, 2019 Spirometry normal No response to bronchodilator at FEV1 Lung volumes normal with total lung capacity 81% predicted. Resistance and specific conductance normal. Moderate reduction diffusion 54% predicted with a loss of functioning alveolocapillary units No decline in pulmonary physiology  Chest x-ray PA lateral projection May 29, 2019 Cardiac size normal Findings consistent with multiple bilateral nodularity interstitial lung disease consistent with known diagnosis of sarcoidosis Impression stage III-4 radiographic features of sarcoidosis  Blood Draw CBC comprehensive metabolic profile ACE level Data review August 28, 2019 CBC White blood count 4.86 hemoglobin 14.7 hematocrit 47.1 Platelet count 183,000 Serum potassium 5.4 BUN 9 creatinine 0.92 Liver function testing normal Total protein normal 8.2 ACE level pending  ACE elevated 156 but decline from prior 171  Echocardiogram completed February 18, 2020 Unremarkable study with no reported pulmonary hypertension

## 2024-02-15 NOTE — REVIEW OF SYSTEMS
[As Noted in HPI] : as noted in HPI [Negative] : Pulmonary Hypertension [de-identified] : left upper vshoulder sarcoid skin lesion bx proven

## 2024-02-15 NOTE — HISTORY OF PRESENT ILLNESS
[Stable] : are stable [Difficulty Breathing During Exertion] : denies dyspnea on exertion [Feelings Of Weakness On Exertion] : denies exercise intolerance [Cough] : denies coughing [Wheezing] : denies wheezing [Regional Soft Tissue Swelling Both Lower Extremities] : denies lower extremity edema [Chest Pain Or Discomfort] : denies chest pain [Fever] : denies fever [Date: ___] : was performed [unfilled] [Wt Gain ___ Lbs] : no recent weight gain [Wt Loss ___ Lbs] : no recent weight loss [Oxygen] : the patient uses no supplemental oxygen [de-identified] : extensive finding consistent with sarcoidosis [FreeTextEntry1] : States up-to-date with management diabetes primary care physician Ramón Heart MD Respiratory status remained stable No comorbidities chest pain chest tightness.  Production hemoptysis No recent fevers chills or sweats  NOTED placement spinal stimulator 2/15/24  COVID vaccinated   new dx DM  not on medication with  management PMD  pending ORTHO R ankle  Patient with long-standing significant biopsy-proven sarcoidosis pulmonary and skin. No active symptoms of chest pain pleuritic chest pain fevers chills or night sweats purulent sputum. Denies hemoptysis. No increased shortness of breath. No cough or wheeze noted. No recent U. upper respiratory infections.  Status post CAT scan the chest May 12, 2017 with no interval change compared to prior scan of April 6, 2016 able to demonstrate right upper lobe consolidation with no significant change left apical consolidation with no significant change. Innumerable pleural and parenchymal nodularity and nodules but again no change in size or number reported. Mediastinum demonstrates prominent mediastinal hilar axillary lymphadenopathy as well as subcarinal adenopathy or reported unchanged. There was some component of partial calcification of the mediastinal and hilar lymph node  ADDENDUM 2/20 Ophthalmology /2/2020 - pt states completed thi s year Sunil No reported uveitis due to sarcoidosis

## 2024-02-15 NOTE — DISCUSSION/SUMMARY
[FreeTextEntry1] : pending placement spinal stimulator  Mild bronchial inflammation elevated NIOX 29 PPD continue to trend Sarcoidosis stage  4  radiographically ABNL PFT with severe Diffusion abnormality Hemoglobin A1c at patient request 10.9 consistent with poorly controlled diabetes under care of primary care physician but patient states interval improvement Chest XRAY -monitor 6 months November 2024  3 mos f/u with transflow March 2021 ECHO  Dr. Frank  negative for pulmonary hypertension or valvular heart disease declined  flu  vaccine in the past Advised risk  benefit  re COVID  Vaccine OPTH up to date Dr Alan Jan 2021 COVID Vaccine  recommended MODERNA  protocol completed Booster  declined flu  vaccine declined pneumonia  vaccine Continue discussed in detail with patient treatment protocols including steroid treatment protocol Plaquenil He feels he is functioning well and would like to hold off in the near unless there is any further decline in his symptomatology

## 2024-05-16 ENCOUNTER — NON-APPOINTMENT (OUTPATIENT)
Age: 64
End: 2024-05-16

## 2024-05-16 ENCOUNTER — APPOINTMENT (OUTPATIENT)
Dept: PULMONOLOGY | Facility: CLINIC | Age: 64
End: 2024-05-16
Payer: MEDICARE

## 2024-05-16 VITALS — DIASTOLIC BLOOD PRESSURE: 83 MMHG | HEART RATE: 67 BPM | OXYGEN SATURATION: 96 % | SYSTOLIC BLOOD PRESSURE: 127 MMHG

## 2024-05-16 DIAGNOSIS — D86.0 SARCOIDOSIS OF LUNG: ICD-10-CM

## 2024-05-16 DIAGNOSIS — R94.2 ABNORMAL RESULTS OF PULMONARY FUNCTION STUDIES: ICD-10-CM

## 2024-05-16 LAB
ALBUMIN SERPL ELPH-MCNC: 4.6 G/DL
ALP BLD-CCNC: 89 U/L
ALT SERPL-CCNC: 29 U/L
ANION GAP SERPL CALC-SCNC: 11 MMOL/L
AST SERPL-CCNC: 24 U/L
BASOPHILS # BLD AUTO: 0.04 K/UL
BASOPHILS NFR BLD AUTO: 0.9 %
BILIRUB SERPL-MCNC: 0.4 MG/DL
BUN SERPL-MCNC: 11 MG/DL
CALCIUM SERPL-MCNC: 9.1 MG/DL
CHLORIDE SERPL-SCNC: 105 MMOL/L
CO2 SERPL-SCNC: 24 MMOL/L
CREAT SERPL-MCNC: 1.04 MG/DL
EGFR: 81 ML/MIN/1.73M2
EOSINOPHIL # BLD AUTO: 0.24 K/UL
EOSINOPHIL NFR BLD AUTO: 5.1 %
GLUCOSE SERPL-MCNC: 120 MG/DL
HCT VFR BLD CALC: 44.1 %
HGB BLD-MCNC: 14.7 G/DL
IMM GRANULOCYTES NFR BLD AUTO: 0 %
LYMPHOCYTES # BLD AUTO: 1.05 K/UL
LYMPHOCYTES NFR BLD AUTO: 22.4 %
MAN DIFF?: NORMAL
MCHC RBC-ENTMCNC: 25.6 PG
MCHC RBC-ENTMCNC: 33.3 GM/DL
MCV RBC AUTO: 76.8 FL
MONOCYTES # BLD AUTO: 0.51 K/UL
MONOCYTES NFR BLD AUTO: 10.9 %
NEUTROPHILS # BLD AUTO: 2.85 K/UL
NEUTROPHILS NFR BLD AUTO: 60.7 %
PLATELET # BLD AUTO: 141 K/UL
POTASSIUM SERPL-SCNC: 5 MMOL/L
PROT SERPL-MCNC: 7.9 G/DL
RBC # BLD: 5.74 M/UL
RBC # FLD: 14.5 %
SODIUM SERPL-SCNC: 140 MMOL/L
WBC # FLD AUTO: 4.69 K/UL

## 2024-05-16 PROCEDURE — 71046 X-RAY EXAM CHEST 2 VIEWS: CPT

## 2024-05-16 PROCEDURE — 36415 COLL VENOUS BLD VENIPUNCTURE: CPT

## 2024-05-16 PROCEDURE — 99214 OFFICE O/P EST MOD 30 MIN: CPT | Mod: 25

## 2024-05-16 PROCEDURE — 95012 NITRIC OXIDE EXP GAS DETER: CPT

## 2024-05-16 PROCEDURE — 94010 BREATHING CAPACITY TEST: CPT

## 2024-05-16 NOTE — PROCEDURE
[FreeTextEntry1] : NIOX 42 consistent with positive airway inflammation May 16, 2024 Spirometry May 16, 2024 FVC 79% predicted Flow rates are normal No decline compared to prior data Chest x-ray PA lateral May 16, 2024 Cardiac size is normal Predominantly mid upper lung zone although some lower lung component of parenchymal infiltrates Findings consistent with known sarcoidosis   Preprocedure PFT February 15, 2024 Mild restrictive ventilatory impairment Severe reduction diffusion at 49% in addition with gas exchange impairment NIOX 38 consistent with inflammatory airway disease and patient with known sarcoidosis February 15, 2024   PFT November 13, 2023 normal reduction of flow rates FEV1 FVC ratio is normal TLC 73% predicted Diffusion 47% predicted Hemoglobin 15.0 Mild restrictive ventilatory impairment with severe gas exchange impairment Clinical correlation patient with extensive radiographic sarcoidosis Overall data comparison demonstrates stable pulmonary physiology  Chest x-ray PA and lateral November 13, 2023 Extensive bilateral opacities with more consolidation at the right upper lung zone with radiographic progression compared to prior chest x-ray of May 12, 2023  PFT August 10, 2023 Mild restrictive ventilatory impairment No air trapping Diffusion 40% predicted with severe functioning alveolar capillary units Hemoglobin 14.6 There is evidence of wax and wane of the total lung capacity Continue to trend   POCT May 12, 2023 Random glucose 95  hemoglobin A1c 10.9% consistent with diabetes  PFT May 12, 2023 normal reduction flow rate PFT 70% predicted Diffusion 53% predicted Hemoglobin 14.6 Mild restrictive ventilatory impairment with moderate borderline severe gas exchange impairment Interval improvement of the TLC and diffusion  Chest x-ray PA lateral May 12, 2023 Cardiac size is normal Extensive stage III sarcoidosis bilateral parenchymal densities opacities no interval change  PFT 1/13/23 mild restrictive  ventilatory impairment TLC 68 % pred  DLCO  45 % with severe loss fx  alveolar capillary units HGB 15.0  Pulmonary 6-minute walk exercise study January 13, 2023 Baseline O2 saturation extraction x-ray positive isaias desaturation to 90% This does not qualify patient for portable oxygen therapy  PFT 10/7/22 mild restrictive DLCO 50 %  with moderate loss fx  alveolar capillary units HGB 13.4  PFT June 30, 2022 Flow rates normal Lung volumes demonstrate a mild reduction at TLC 76% predicted Diffusion 50% predicted with a moderate loss of functioning alveolar capillary units Hemoglobin 13.4 Overall interval improvement of pulmonary physiology NIOX 25 upper limits of normal  Chest x-ray PA lateral January 13, 2023 Normal cardiac size Right upper lobe conglomerate consolidation greater than left lung with overall abnormalities consistent with known sarcoidosis no interval change dating back to chest x-ray July 12, 2021 by definition minimum late stage III-IV sarcoid disease  PFT 2/10/22 Mild reduction flow rates  TLC 67 % DLCO 43 %  IMP Mild restrictive  ventilatory impairment with severe gas exchange impairment  HGB 16.8 NIOC 28 minimal bronchial inflammation 2/20/22  PFT 11/8/2021 minimal reduction flow rates  TLC  74 %  DLCO 48 % HGB14.3  PFT 8/9/21 Normal  flow Rates  TLC 64 % mild moderate redeuction  DLCO 48 % with severe gas exchange impairment HGB 15.0 NIOX 32 ppb mild bronchial inflammation 8/9/21  PFT 4/5/21 Mild restrictive  ventilatory impairment severe reduction DLCO  with loss fx  alveolar  capillary units HGB 14.6 NIOX   28  ppb minimal elevation 4/5/21  Chest x-ray PA lateral July 12, 2021 Normal cardiac size Extensive bilateral parenchymal opacities with right upper lobe component of consolidation Consistent with known diagnosis of sarcoidosis No interval change compared to chest x-ray July 14, 2020  Chest x-ray PA lateral July 14, 2020 Normal cardiac size extensive bilateral opacities with more consolidation right upper lateral lung zone Compared to a chest x-ray of May 29, 2019 there is no gross interval change identified Impression consistent with known diagnosis of stage IV pulmonary sarcoidosis  PFT February 18, 2020 Very minimal reduction in flow rates No bronchodilator response Total capacity 77% of predicted cannot exclude a very mild restrictive component Severe reduction diffusion 49% predicted with a loss of functioning alveolocapillary units  PFT  body box August 27, 2019 Spirometry normal No response to bronchodilator at FEV1 Lung volumes normal with total lung capacity 81% predicted. Resistance and specific conductance normal. Moderate reduction diffusion 54% predicted with a loss of functioning alveolocapillary units No decline in pulmonary physiology  Chest x-ray PA lateral projection May 29, 2019 Cardiac size normal Findings consistent with multiple bilateral nodularity interstitial lung disease consistent with known diagnosis of sarcoidosis Impression stage III-4 radiographic features of sarcoidosis  Blood Draw CBC comprehensive metabolic profile ACE level Data review August 28, 2019 CBC White blood count 4.86 hemoglobin 14.7 hematocrit 47.1 Platelet count 183,000 Serum potassium 5.4 BUN 9 creatinine 0.92 Liver function testing normal Total protein normal 8.2 ACE level pending  ACE elevated 156 but decline from prior 171  Echocardiogram completed February 18, 2020 Unremarkable study with no reported pulmonary hypertension  blood draw

## 2024-05-16 NOTE — DISCUSSION/SUMMARY
[FreeTextEntry1] : pending placement spinal stimulator  Mild bronchial inflammation elevated NIOX 29 ppb Sarcoidosis stage  4  radiographically ABNL PFT with severe Diffusion abnormality Hemoglobin A1c at patient request 10.9 consistent with poorly controlled diabetes under care of primary care physician but patient states interval improvement Chest XRAY -monitor 6 months   3 mos f/u with transflow March 2021 ECHO  Dr. Frank  negative for pulmonary hypertension or valvular heart disease declined  flu  vaccine in the past Advised risk  benefit  re COVID  Vaccine OPTH up to date Dr Alan Jan 2021 COVID Vaccine  recommended MODERNA  protocol completed Booster  declined flu  vaccine declined pneumonia  vaccine Continue discussed in detail with patient treatment protocols including steroid treatment protocol Plaquenil He feels he is functioning well and would like to hold off in the near unless there is any further decline in his symptomatology

## 2024-05-16 NOTE — REVIEW OF SYSTEMS
[As Noted in HPI] : as noted in HPI [Negative] : Pulmonary Hypertension [de-identified] : left upper vshoulder sarcoid skin lesion bx proven

## 2024-05-16 NOTE — HISTORY OF PRESENT ILLNESS
[Stable] : are stable [Difficulty Breathing During Exertion] : denies dyspnea on exertion [Feelings Of Weakness On Exertion] : denies exercise intolerance [Cough] : denies coughing [Wheezing] : denies wheezing [Regional Soft Tissue Swelling Both Lower Extremities] : denies lower extremity edema [Chest Pain Or Discomfort] : denies chest pain [Fever] : denies fever [Date: ___] : was performed [unfilled] [Wt Gain ___ Lbs] : no recent weight gain [Wt Loss ___ Lbs] : no recent weight loss [Oxygen] : the patient uses no supplemental oxygen [de-identified] : extensive finding consistent with sarcoidosis [FreeTextEntry1] : States up-to-date with management diabetes primary care physician Ramón Heart MD Respiratory status remained stable No comorbidities chest pain chest tightness.  Production hemoptysis No recent fevers chills or sweats  NOTED placement spinal stimulator 2/15/24  COVID vaccinated   new dx DM  not on medication with  management PMD  pending ORTHO R ankle  Patient with long-standing significant biopsy-proven sarcoidosis pulmonary and skin. No active symptoms of chest pain pleuritic chest pain fevers chills or night sweats purulent sputum. Denies hemoptysis. No increased shortness of breath. No cough or wheeze noted. No recent U. upper respiratory infections.  Status post CAT scan the chest May 12, 2017 with no interval change compared to prior scan of April 6, 2016 able to demonstrate right upper lobe consolidation with no significant change left apical consolidation with no significant change. Innumerable pleural and parenchymal nodularity and nodules but again no change in size or number reported. Mediastinum demonstrates prominent mediastinal hilar axillary lymphadenopathy as well as subcarinal adenopathy or reported unchanged. There was some component of partial calcification of the mediastinal and hilar lymph node  ADDENDUM 2/20 Ophthalmology /2/2020 - pt states completed thi s year Sunil No reported uveitis due to sarcoidosis

## 2024-05-17 LAB — ACE BLD-CCNC: 99 U/L

## 2024-09-16 ENCOUNTER — APPOINTMENT (OUTPATIENT)
Dept: PULMONOLOGY | Facility: CLINIC | Age: 64
End: 2024-09-16
Payer: MEDICARE

## 2024-09-16 VITALS
SYSTOLIC BLOOD PRESSURE: 138 MMHG | DIASTOLIC BLOOD PRESSURE: 78 MMHG | HEART RATE: 61 BPM | OXYGEN SATURATION: 95 % | RESPIRATION RATE: 16 BRPM

## 2024-09-16 DIAGNOSIS — D86.0 SARCOIDOSIS OF LUNG: ICD-10-CM

## 2024-09-16 DIAGNOSIS — R94.2 ABNORMAL RESULTS OF PULMONARY FUNCTION STUDIES: ICD-10-CM

## 2024-09-16 LAB — POCT - HEMOGLOBIN (HGB), QUANTITATIVE, TRANSCUTANEOUS: 13.6

## 2024-09-16 PROCEDURE — 94010 BREATHING CAPACITY TEST: CPT

## 2024-09-16 PROCEDURE — 95012 NITRIC OXIDE EXP GAS DETER: CPT

## 2024-09-16 PROCEDURE — 94729 DIFFUSING CAPACITY: CPT

## 2024-09-16 PROCEDURE — 36415 COLL VENOUS BLD VENIPUNCTURE: CPT

## 2024-09-16 PROCEDURE — 88738 HGB QUANT TRANSCUTANEOUS: CPT

## 2024-09-16 PROCEDURE — 94727 GAS DIL/WSHOT DETER LNG VOL: CPT

## 2024-09-16 PROCEDURE — 99214 OFFICE O/P EST MOD 30 MIN: CPT | Mod: 25

## 2024-09-16 NOTE — PHYSICAL EXAM
[General Appearance - Well Developed] : well developed [Normal Appearance] : normal appearance [Well Groomed] : well groomed [No Deformities] : no deformities [General Appearance - Well Nourished] : well nourished [General Appearance - In No Acute Distress] : no acute distress [Normal Conjunctiva] : the conjunctiva exhibited no abnormalities [Eyelids - No Xanthelasma] : the eyelids demonstrated no xanthelasmas [Normal Oropharynx] : normal oropharynx [Neck Appearance] : the appearance of the neck was normal [Neck Cervical Mass (___cm)] : no neck mass was observed [Jugular Venous Distention Increased] : there was no jugular-venous distention [Thyroid Diffuse Enlargement] : the thyroid was not enlarged [Thyroid Nodule] : there were no palpable thyroid nodules [Heart Rate And Rhythm] : heart rate and rhythm were normal [Heart Sounds] : normal S1 and S2 [Murmurs] : no murmurs present [Respiration, Rhythm And Depth] : normal respiratory rhythm and effort [Exaggerated Use Of Accessory Muscles For Inspiration] : no accessory muscle use [Auscultation Breath Sounds / Voice Sounds] : lungs were clear to auscultation bilaterally [Lungs Percussion] : the lungs were normal to percussion [Bowel Sounds] : normal bowel sounds [Abdomen Soft] : soft [Abdomen Tenderness] : non-tender [Abdomen Mass (___ Cm)] : no abdominal mass palpated [Nail Clubbing] : no clubbing of the fingernails [Cyanosis, Localized] : no localized cyanosis [Petechial Hemorrhages (___cm)] : no petechial hemorrhages [] : no ischemic changes [Deep Tendon Reflexes (DTR)] : deep tendon reflexes were 2+ and symmetric [Sensation] : the sensory exam was normal to light touch and pinprick [No Focal Deficits] : no focal deficits [Oriented To Time, Place, And Person] : oriented to person, place, and time [Impaired Insight] : insight and judgment were intact [Affect] : the affect was normal [FreeTextEntry1] : chronic sarcoid rash Left posterior  thorax

## 2024-09-16 NOTE — HISTORY OF PRESENT ILLNESS
[Stable] : are stable [Difficulty Breathing During Exertion] : denies dyspnea on exertion [Feelings Of Weakness On Exertion] : denies exercise intolerance [Cough] : denies coughing [Wheezing] : denies wheezing [Regional Soft Tissue Swelling Both Lower Extremities] : denies lower extremity edema [Chest Pain Or Discomfort] : denies chest pain [Fever] : denies fever [Date: ___] : was performed [unfilled] [Never] : never [Wt Gain ___ Lbs] : no recent weight gain [Wt Loss ___ Lbs] : no recent weight loss [Oxygen] : the patient uses no supplemental oxygen [de-identified] : extensive finding consistent with sarcoidosis [FreeTextEntry1] : States up-to-date with management diabetes primary care physician Ramón Heart MD Respiratory status remained stable No comorbidities chest pain chest tightness.  Production hemoptysis No recent fevers chills or sweats  NOTED placement spinal stimulator 2/15/24  COVID vaccinated   new dx DM  not on medication with  management PMD  pending ORTHO R ankle  Patient with long-standing significant biopsy-proven sarcoidosis pulmonary and skin. No active symptoms of chest pain pleuritic chest pain fevers chills or night sweats purulent sputum. Denies hemoptysis. No increased shortness of breath. No cough or wheeze noted. No recent U. upper respiratory infections.  Status post CAT scan the chest May 12, 2017 with no interval change compared to prior scan of April 6, 2016 able to demonstrate right upper lobe consolidation with no significant change left apical consolidation with no significant change. Innumerable pleural and parenchymal nodularity and nodules but again no change in size or number reported. Mediastinum demonstrates prominent mediastinal hilar axillary lymphadenopathy as well as subcarinal adenopathy or reported unchanged. There was some component of partial calcification of the mediastinal and hilar lymph node  ADDENDUM 2/20 Ophthalmology /2/2020 - pt states completed thi s year Sunil No reported uveitis due to sarcoidosis  [TextBox_4] : 09*/16/2024 here for follow up, no new respiratory sx noted

## 2024-09-16 NOTE — REVIEW OF SYSTEMS
[As Noted in HPI] : as noted in HPI [Negative] : Pulmonary Hypertension [de-identified] : left upper vshoulder sarcoid skin lesion bx proven

## 2024-09-16 NOTE — PROCEDURE
[FreeTextEntry1] : NIOX 35 consistent with airway inflammation interval improvement September 16, 2024 PFT September 16, 2024 Mild restrictive ventilatory impairment No air-trapping Diffusion with a mild loss of functioning alveolar capillary units 50% predicted No interval decline compared to prior study of 6 months ago   NIOX 42 consistent with positive airway inflammation May 16, 2024 Spirometry May 16, 2024 FVC 79% predicted Flow rates are normal No decline compared to prior data Chest x-ray PA lateral May 16, 2024 Cardiac size is normal Predominantly mid upper lung zone although some lower lung component of parenchymal infiltrates Findings consistent with known sarcoidosis   Preprocedure PFT February 15, 2024 Mild restrictive ventilatory impairment Severe reduction diffusion at 49% in addition with gas exchange impairment NIOX 38 consistent with inflammatory airway disease and patient with known sarcoidosis February 15, 2024   PFT November 13, 2023 normal reduction of flow rates FEV1 FVC ratio is normal TLC 73% predicted Diffusion 47% predicted Hemoglobin 15.0 Mild restrictive ventilatory impairment with severe gas exchange impairment Clinical correlation patient with extensive radiographic sarcoidosis Overall data comparison demonstrates stable pulmonary physiology  Chest x-ray PA and lateral November 13, 2023 Extensive bilateral opacities with more consolidation at the right upper lung zone with radiographic progression compared to prior chest x-ray of May 12, 2023  PFT August 10, 2023 Mild restrictive ventilatory impairment No air trapping Diffusion 40% predicted with severe functioning alveolar capillary units Hemoglobin 14.6 There is evidence of wax and wane of the total lung capacity Continue to trend   POCT May 12, 2023 Random glucose 95  hemoglobin A1c 10.9% consistent with diabetes  PFT May 12, 2023 normal reduction flow rate PFT 70% predicted Diffusion 53% predicted Hemoglobin 14.6 Mild restrictive ventilatory impairment with moderate borderline severe gas exchange impairment Interval improvement of the TLC and diffusion  Chest x-ray PA lateral May 12, 2023 Cardiac size is normal Extensive stage III sarcoidosis bilateral parenchymal densities opacities no interval change  PFT 1/13/23 mild restrictive  ventilatory impairment TLC 68 % pred  DLCO  45 % with severe loss fx  alveolar capillary units HGB 15.0  Pulmonary 6-minute walk exercise study January 13, 2023 Baseline O2 saturation extraction x-ray positive isaias desaturation to 90% This does not qualify patient for portable oxygen therapy  PFT 10/7/22 mild restrictive DLCO 50 %  with moderate loss fx  alveolar capillary units HGB 13.4  PFT June 30, 2022 Flow rates normal Lung volumes demonstrate a mild reduction at TLC 76% predicted Diffusion 50% predicted with a moderate loss of functioning alveolar capillary units Hemoglobin 13.4 Overall interval improvement of pulmonary physiology NIOX 25 upper limits of normal  Chest x-ray PA lateral January 13, 2023 Normal cardiac size Right upper lobe conglomerate consolidation greater than left lung with overall abnormalities consistent with known sarcoidosis no interval change dating back to chest x-ray July 12, 2021 by definition minimum late stage III-IV sarcoid disease  PFT 2/10/22 Mild reduction flow rates  TLC 67 % DLCO 43 %  IMP Mild restrictive  ventilatory impairment with severe gas exchange impairment  HGB 16.8 NIOC 28 minimal bronchial inflammation 2/20/22  PFT 11/8/2021 minimal reduction flow rates  TLC  74 %  DLCO 48 % HGB14.3  PFT 8/9/21 Normal  flow Rates  TLC 64 % mild moderate redeuction  DLCO 48 % with severe gas exchange impairment HGB 15.0 NIOX 32 ppb mild bronchial inflammation 8/9/21  PFT 4/5/21 Mild restrictive  ventilatory impairment severe reduction DLCO  with loss fx  alveolar  capillary units HGB 14.6 NIOX   28  ppb minimal elevation 4/5/21  Chest x-ray PA lateral July 12, 2021 Normal cardiac size Extensive bilateral parenchymal opacities with right upper lobe component of consolidation Consistent with known diagnosis of sarcoidosis No interval change compared to chest x-ray July 14, 2020  Chest x-ray PA lateral July 14, 2020 Normal cardiac size extensive bilateral opacities with more consolidation right upper lateral lung zone Compared to a chest x-ray of May 29, 2019 there is no gross interval change identified Impression consistent with known diagnosis of stage IV pulmonary sarcoidosis  PFT February 18, 2020 Very minimal reduction in flow rates No bronchodilator response Total capacity 77% of predicted cannot exclude a very mild restrictive component Severe reduction diffusion 49% predicted with a loss of functioning alveolocapillary units  PFT  body box August 27, 2019 Spirometry normal No response to bronchodilator at FEV1 Lung volumes normal with total lung capacity 81% predicted. Resistance and specific conductance normal. Moderate reduction diffusion 54% predicted with a loss of functioning alveolocapillary units No decline in pulmonary physiology  Chest x-ray PA lateral projection May 29, 2019 Cardiac size normal Findings consistent with multiple bilateral nodularity interstitial lung disease consistent with known diagnosis of sarcoidosis Impression stage III-4 radiographic features of sarcoidosis  Blood Draw CBC comprehensive metabolic profile ACE level Data review August 28, 2019 CBC White blood count 4.86 hemoglobin 14.7 hematocrit 47.1 Platelet count 183,000 Serum potassium 5.4 BUN 9 creatinine 0.92 Liver function testing normal Total protein normal 8.2 ACE level pending  ACE elevated 156 but decline from prior 171  Echocardiogram completed February 18, 2020 Unremarkable study with no reported pulmonary hypertension  blood draw

## 2024-09-17 ENCOUNTER — NON-APPOINTMENT (OUTPATIENT)
Age: 64
End: 2024-09-17

## 2024-09-17 LAB
ACE BLD-CCNC: 135 U/L
ANION GAP SERPL CALC-SCNC: 10 MMOL/L
BUN SERPL-MCNC: 12 MG/DL
CALCIUM SERPL-MCNC: 9 MG/DL
CHLORIDE SERPL-SCNC: 104 MMOL/L
CO2 SERPL-SCNC: 25 MMOL/L
CREAT SERPL-MCNC: 0.92 MG/DL
EGFR: 93 ML/MIN/1.73M2
GLUCOSE SERPL-MCNC: 125 MG/DL
POTASSIUM SERPL-SCNC: 5.1 MMOL/L
SODIUM SERPL-SCNC: 140 MMOL/L

## 2024-12-19 ENCOUNTER — APPOINTMENT (OUTPATIENT)
Dept: PULMONOLOGY | Facility: CLINIC | Age: 64
End: 2024-12-19

## 2025-01-22 ENCOUNTER — APPOINTMENT (OUTPATIENT)
Dept: PULMONOLOGY | Facility: CLINIC | Age: 65
End: 2025-01-22
Payer: MEDICARE

## 2025-01-22 ENCOUNTER — APPOINTMENT (OUTPATIENT)
Dept: CARDIOLOGY | Facility: CLINIC | Age: 65
End: 2025-01-22
Payer: MEDICARE

## 2025-01-22 VITALS
TEMPERATURE: 97.8 F | SYSTOLIC BLOOD PRESSURE: 122 MMHG | HEIGHT: 69 IN | WEIGHT: 165 LBS | OXYGEN SATURATION: 97 % | DIASTOLIC BLOOD PRESSURE: 76 MMHG | BODY MASS INDEX: 24.44 KG/M2 | HEART RATE: 67 BPM

## 2025-01-22 DIAGNOSIS — R94.2 ABNORMAL RESULTS OF PULMONARY FUNCTION STUDIES: ICD-10-CM

## 2025-01-22 DIAGNOSIS — D86.0 SARCOIDOSIS OF LUNG: ICD-10-CM

## 2025-01-22 LAB — POCT - HEMOGLOBIN (HGB), QUANTITATIVE, TRANSCUTANEOUS: 14.8

## 2025-01-22 PROCEDURE — 95012 NITRIC OXIDE EXP GAS DETER: CPT

## 2025-01-22 PROCEDURE — 99214 OFFICE O/P EST MOD 30 MIN: CPT | Mod: 25

## 2025-01-22 PROCEDURE — 88738 HGB QUANT TRANSCUTANEOUS: CPT

## 2025-01-22 PROCEDURE — ZZZZZ: CPT

## 2025-01-22 PROCEDURE — 94727 GAS DIL/WSHOT DETER LNG VOL: CPT

## 2025-01-22 PROCEDURE — 93306 TTE W/DOPPLER COMPLETE: CPT

## 2025-01-22 PROCEDURE — 94010 BREATHING CAPACITY TEST: CPT

## 2025-01-22 PROCEDURE — 94618 PULMONARY STRESS TESTING: CPT

## 2025-01-22 PROCEDURE — 94729 DIFFUSING CAPACITY: CPT

## 2025-01-23 ENCOUNTER — NON-APPOINTMENT (OUTPATIENT)
Age: 65
End: 2025-01-23

## 2025-01-23 DIAGNOSIS — I34.1 NONRHEUMATIC MITRAL (VALVE) PROLAPSE: ICD-10-CM

## 2025-01-23 DIAGNOSIS — I51.9 HEART DISEASE, UNSPECIFIED: ICD-10-CM

## 2025-04-10 ENCOUNTER — TRANSCRIPTION ENCOUNTER (OUTPATIENT)
Age: 65
End: 2025-04-10

## 2025-05-12 ENCOUNTER — APPOINTMENT (OUTPATIENT)
Dept: ORTHOPEDIC SURGERY | Facility: CLINIC | Age: 65
End: 2025-05-12
Payer: MEDICARE

## 2025-05-12 VITALS — WEIGHT: 165 LBS | HEIGHT: 69 IN | BODY MASS INDEX: 24.44 KG/M2

## 2025-05-12 DIAGNOSIS — M51.369: ICD-10-CM

## 2025-05-12 PROCEDURE — 99204 OFFICE O/P NEW MOD 45 MIN: CPT

## 2025-05-15 ENCOUNTER — APPOINTMENT (OUTPATIENT)
Dept: PULMONOLOGY | Facility: CLINIC | Age: 65
End: 2025-05-15
Payer: MEDICARE

## 2025-05-15 VITALS
OXYGEN SATURATION: 98 % | SYSTOLIC BLOOD PRESSURE: 118 MMHG | BODY MASS INDEX: 24.44 KG/M2 | TEMPERATURE: 98.6 F | HEIGHT: 69 IN | DIASTOLIC BLOOD PRESSURE: 78 MMHG | HEART RATE: 74 BPM | WEIGHT: 165 LBS

## 2025-05-15 DIAGNOSIS — R94.2 ABNORMAL RESULTS OF PULMONARY FUNCTION STUDIES: ICD-10-CM

## 2025-05-15 DIAGNOSIS — I34.1 NONRHEUMATIC MITRAL (VALVE) PROLAPSE: ICD-10-CM

## 2025-05-15 DIAGNOSIS — I51.9 HEART DISEASE, UNSPECIFIED: ICD-10-CM

## 2025-05-15 DIAGNOSIS — D86.0 SARCOIDOSIS OF LUNG: ICD-10-CM

## 2025-05-15 LAB — POCT - HEMOGLOBIN (HGB), QUANTITATIVE, TRANSCUTANEOUS: 14.3

## 2025-05-15 PROCEDURE — 99214 OFFICE O/P EST MOD 30 MIN: CPT | Mod: 25

## 2025-05-15 PROCEDURE — ZZZZZ: CPT

## 2025-05-15 PROCEDURE — 94729 DIFFUSING CAPACITY: CPT

## 2025-05-15 PROCEDURE — 88738 HGB QUANT TRANSCUTANEOUS: CPT

## 2025-05-15 PROCEDURE — 95012 NITRIC OXIDE EXP GAS DETER: CPT

## 2025-05-15 PROCEDURE — 94010 BREATHING CAPACITY TEST: CPT

## 2025-05-15 PROCEDURE — 94727 GAS DIL/WSHOT DETER LNG VOL: CPT

## 2025-05-15 PROCEDURE — 36415 COLL VENOUS BLD VENIPUNCTURE: CPT

## 2025-05-15 PROCEDURE — 71046 X-RAY EXAM CHEST 2 VIEWS: CPT

## 2025-05-16 ENCOUNTER — NON-APPOINTMENT (OUTPATIENT)
Age: 65
End: 2025-05-16

## 2025-05-16 LAB
ACE BLD-CCNC: 154 U/L
ANION GAP SERPL CALC-SCNC: 15 MMOL/L
BUN SERPL-MCNC: 8 MG/DL
CALCIUM SERPL-MCNC: 9.4 MG/DL
CHLORIDE SERPL-SCNC: 103 MMOL/L
CO2 SERPL-SCNC: 23 MMOL/L
CREAT SERPL-MCNC: 0.98 MG/DL
EGFRCR SERPLBLD CKD-EPI 2021: 86 ML/MIN/1.73M2
GLUCOSE SERPL-MCNC: 129 MG/DL
POTASSIUM SERPL-SCNC: 5 MMOL/L
SODIUM SERPL-SCNC: 141 MMOL/L

## 2025-08-15 ENCOUNTER — APPOINTMENT (OUTPATIENT)
Dept: PULMONOLOGY | Facility: CLINIC | Age: 65
End: 2025-08-15
Payer: MEDICARE

## 2025-08-15 VITALS — HEART RATE: 69 BPM | SYSTOLIC BLOOD PRESSURE: 115 MMHG | OXYGEN SATURATION: 98 % | DIASTOLIC BLOOD PRESSURE: 78 MMHG

## 2025-08-15 DIAGNOSIS — D86.0 SARCOIDOSIS OF LUNG: ICD-10-CM

## 2025-08-15 DIAGNOSIS — R94.2 ABNORMAL RESULTS OF PULMONARY FUNCTION STUDIES: ICD-10-CM

## 2025-08-15 PROCEDURE — 99214 OFFICE O/P EST MOD 30 MIN: CPT | Mod: 25

## 2025-08-15 PROCEDURE — 95012 NITRIC OXIDE EXP GAS DETER: CPT

## 2025-08-15 PROCEDURE — 94727 GAS DIL/WSHOT DETER LNG VOL: CPT

## 2025-08-15 PROCEDURE — 94010 BREATHING CAPACITY TEST: CPT

## 2025-08-15 PROCEDURE — 94729 DIFFUSING CAPACITY: CPT

## 2025-08-15 PROCEDURE — ZZZZZ: CPT
